# Patient Record
Sex: FEMALE | Race: WHITE | Employment: OTHER | URBAN - METROPOLITAN AREA
[De-identification: names, ages, dates, MRNs, and addresses within clinical notes are randomized per-mention and may not be internally consistent; named-entity substitution may affect disease eponyms.]

---

## 2017-06-23 PROBLEM — K57.32 DIVERTICULITIS LARGE INTESTINE W/O PERFORATION OR ABSCESS W/O BLEEDING: Status: ACTIVE | Noted: 2017-06-23

## 2020-10-20 ENCOUNTER — TELEPHONE (OUTPATIENT)
Dept: INTERNAL MEDICINE CLINIC | Age: 70
End: 2020-10-20

## 2020-10-20 NOTE — TELEPHONE ENCOUNTER
Patient states she was in the process of setting up as a new Patient with Dr. Carlos Ordoñez but got cut off before she could get scheduled. She is asking that Kaur Nicole call her back at [phone] to schedule.

## 2020-11-17 ENCOUNTER — OFFICE VISIT (OUTPATIENT)
Dept: INTERNAL MEDICINE CLINIC | Age: 70
End: 2020-11-17
Payer: MEDICARE

## 2020-11-17 VITALS
BODY MASS INDEX: 24.73 KG/M2 | DIASTOLIC BLOOD PRESSURE: 66 MMHG | SYSTOLIC BLOOD PRESSURE: 120 MMHG | RESPIRATION RATE: 12 BRPM | WEIGHT: 167 LBS | HEIGHT: 69 IN | TEMPERATURE: 96.8 F | HEART RATE: 88 BPM

## 2020-11-17 PROCEDURE — 1123F ACP DISCUSS/DSCN MKR DOCD: CPT | Performed by: INTERNAL MEDICINE

## 2020-11-17 PROCEDURE — G8427 DOCREV CUR MEDS BY ELIG CLIN: HCPCS | Performed by: INTERNAL MEDICINE

## 2020-11-17 PROCEDURE — G8420 CALC BMI NORM PARAMETERS: HCPCS | Performed by: INTERNAL MEDICINE

## 2020-11-17 PROCEDURE — G8400 PT W/DXA NO RESULTS DOC: HCPCS | Performed by: INTERNAL MEDICINE

## 2020-11-17 PROCEDURE — 4040F PNEUMOC VAC/ADMIN/RCVD: CPT | Performed by: INTERNAL MEDICINE

## 2020-11-17 PROCEDURE — 4004F PT TOBACCO SCREEN RCVD TLK: CPT | Performed by: INTERNAL MEDICINE

## 2020-11-17 PROCEDURE — G8484 FLU IMMUNIZE NO ADMIN: HCPCS | Performed by: INTERNAL MEDICINE

## 2020-11-17 PROCEDURE — 3017F COLORECTAL CA SCREEN DOC REV: CPT | Performed by: INTERNAL MEDICINE

## 2020-11-17 PROCEDURE — 99204 OFFICE O/P NEW MOD 45 MIN: CPT | Performed by: INTERNAL MEDICINE

## 2020-11-17 PROCEDURE — 1090F PRES/ABSN URINE INCON ASSESS: CPT | Performed by: INTERNAL MEDICINE

## 2020-11-17 RX ORDER — ASPIRIN 81 MG/1
81 TABLET ORAL DAILY
COMMUNITY

## 2020-11-17 ASSESSMENT — PATIENT HEALTH QUESTIONNAIRE - PHQ9
2. FEELING DOWN, DEPRESSED OR HOPELESS: 0
1. LITTLE INTEREST OR PLEASURE IN DOING THINGS: 0
SUM OF ALL RESPONSES TO PHQ QUESTIONS 1-9: 0
SUM OF ALL RESPONSES TO PHQ QUESTIONS 1-9: 0
SUM OF ALL RESPONSES TO PHQ9 QUESTIONS 1 & 2: 0
SUM OF ALL RESPONSES TO PHQ QUESTIONS 1-9: 0

## 2020-11-17 NOTE — PATIENT INSTRUCTIONS
To do list:    #1 please schedule your mammogram.  Order provided by Dr. Tomy Hankins    #2 please schedule your bone density (DEXA). Order provided by Dr. Tomy Hankins    #3 please obtain your lab test as soon as possible. LAB address----> [address]    #4  Provide doses of your vitamin D, vitamin C, vitamin D    #5 please schedule with our psychologist, Dr. Lorie Amos    Patient Education        Adjustment Disorder: Care Instructions  Your Care Instructions     Adjustment disorder means that you have emotional or behavioral problems because of stress. But your response to the stress is far more severe than a normal response. It is severe enough to affect your work or social life and may cause depression and physical pains and problems. Events that may cause this response can include a divorce, money problems, or starting school or a new job. It might be anything that causes some stress. This disorder is most often a short-term problem. It happens within 3 months of the stressful event or change. If the response lasts longer than 6 months after the event ends, you may have a more serious disorder. Follow-up care is a key part of your treatment and safety. Be sure to make and go to all appointments, and call your doctor if you are having problems. It's also a good idea to know your test results and keep a list of the medicines you take. How can you care for yourself at home? · Go to all counseling sessions. Do not skip any because you are feeling better. · If your doctor prescribed medicines, take them exactly as prescribed. Call your doctor if you think you are having a problem with your medicine. You will get more details on the specific medicines your doctor prescribes. · Discuss the causes of your stress with a good friend or family member. Or you can join a support group for people with similar problems. Talking to others sometimes relieves stress.   · Get at least 30 minutes of exercise on most days of the week. Walking is a good choice. You also may want to do other activities, such as running, swimming, cycling, or playing tennis or team sports. Relaxation techniques  Do relaxation exercises 10 to 20 minutes a day. You can play soothing, relaxing music while you do them, if you wish. · Tell others in your house that you are going to do your relaxation exercises. Ask them not to disturb you. · Find a comfortable, quiet place. · Lie down on your back, or sit with your back straight. · Focus on your breathing. Make it slow and steady. · Breathe in through your nose. Breathe out through either your nose or mouth. · Breathe deeply, filling up the area between your navel and your rib cage. Breathe so that your belly goes up and down. · Do not hold your breath. · Breathe like this for 5 to 10 minutes. Notice the feeling of calmness throughout your whole body. As you continue to breathe slowly and deeply, relax by doing these next steps for another 5 to 10 minutes:  · Tighten and relax each muscle group in your body. Start at your toes, and work your way up to your head. · Imagine your muscle groups relaxing and getting heavy. · Empty your mind of all thoughts. · Let yourself relax more and more deeply. · Be aware of the state of calmness that surrounds you. · When your relaxation time is over, you can bring yourself back to alertness by moving your fingers and toes. Then move your hands and feet. And then move your entire body. Sometimes people fall asleep during relaxation. But they most often wake up soon. · Always give yourself time to return to full alertness before you drive a car. Wait to do anything that might cause an accident if you are not fully alert. Never play a relaxation tape while you drive a car. When should you call for help? Call 911 anytime you think you may need emergency care. For example, call if:    · You feel you cannot stop from hurting yourself or someone else.  Keep the numbers for these national suicide hotlines: 1-564-095-TALK (7-178.608.5769) and 8-793-DOGGANI (0-104.136.5917). If you or someone you know talks about suicide or feeling hopeless, get help right away. Watch closely for changes in your health, and be sure to contact your doctor if:    · You have new anxiety, or your anxiety gets worse.     · You have been feeling sad, depressed, or hopeless or have lost interest in things that you usually enjoy.     · You do not get better as expected. Where can you learn more? Go to https://Acton PharmaceuticalspemyDrugCostseweb.Metranome. org and sign in to your [employer] account. Enter 0688 [id number] in the L[employer] L[employer]o box to learn more about \"Adjustment Disorder: Care Instructions. \"     If you do not have an account, please click on the \"Sign Up Now\" link. Current as of: December 16, 2019               Content Version: 12.6  © 7640-6052 TeleDNA. Care instructions adapted under license by [facility] ([facility]). If you have questions about a medical condition or this instruction, always ask your healthcare professional. [place] [address] any warranty or liability for your use of this information. Patient Education        Hyperthyroidism: Care Instructions  Your Care Instructions  Hyperthyroidism occurs when the thyroid gland makes too much thyroid hormone. This speeds up your metabolism--how your body uses energy. This condition can cause you to be very active, lose weight, and have sleep problems, eye problems, and a fast heart rate. It can also cause a goiter. A goiter is an enlarged thyroid gland that you can see at the front of the neck. Hyperthyroidism is often caused by Graves' disease. In Graves' disease, the body's defense (immune) system attacks the thyroid gland. Your doctor may prescribe a beta-blocker medicine to slow your pulse and calm you down. But this is not a treatment for hyperthyroidism. It is given for your fast heart rate.  Your doctor may also give you antithyroid medicine. This medicine keeps excess thyroid hormone in check. In some cases, doctors recommend radioactive iodine or surgery to remove the thyroid. After either of these treatments, you may need to take medicine to replace thyroid hormone for the rest of your life. Follow-up care is a key part of your treatment and safety. Be sure to make and go to all appointments, and call your doctor if you are having problems. It's also a good idea to know your test results and keep a list of the medicines you take. How can you care for yourself at home? · Take your medicines exactly as prescribed. You need to take the thyroid medicine at the same time each day. Call your doctor if you think you are having a problem with your medicine. · Graves' disease can make your eyes sore. Use artificial tears, eye drops, and sunglasses to protect your eyes from dryness, wind, and sun. Raise your head with pillows at night to prevent your eyes from swelling. In some cases, taping your eyelids shut at night will keep your eyes from being dry in the morning. · Make sure you get enough calcium. Foods that are rich in calcium include milk, yogurt, cheese, and dark green vegetables. · If you need to gain weight, ask your doctor about special diets. · Do not eat kelp. Marie Bello is high in iodine, which can make hyperthyroidism worse. Marie Mar is commonly used in [employer] and other [place] foods. You can use iodized salt and eat bread and seafood. Try to eat a balanced diet. · Do not use caffeine and other stimulants. These can make symptoms worse, such as a fast heartbeat, nervousness, and problems focusing. · Do not smoke. Smoking can make your condition worse and may lead to more serious eye problems. If you need help quitting, talk to your doctor about stop-smoking programs and medicines. These can increase your chances of quitting for good. · Lower your stress.  Learn to use biofeedback, guided imagery, meditation, or other methods to relax. · Use creams or ointments for irritated skin. Ask your doctor which type to use. · Tell all your doctors about your condition. They need to know because some medicines contain iodine. When should you call for help? Call your doctor now or seek immediate medical care if:    · You have symptoms of a sudden, very high thyroid level (thyroid storm). These include:  ? Being nauseated, vomiting, and having diarrhea. ? Sweating a lot. ? Feeling extremely restless and confused. ? Having a high fever. ? Having a fast heartbeat.     · You have sudden vision changes or eye pain.     · You have a fever or severe sore throat and are taking antithyroid medicines, such as PTU or methimazole. Watch closely for changes in your health, and be sure to contact your doctor if:    · You have a sore throat or have problems swallowing.     · You have swollen, itchy, or red eyes or your other eye symptoms get worse, or you have new vision problems.     · You have signs of a low thyroid level (hypothyroidism). You may feel very tired, confused, or weak. Where can you learn more? Go to https://Pureflection Day Spa & Hair Studiopepiceweb.Boost My Ads. org and sign in to your uTrack TV account. Enter U333 in the HotLink box to learn more about \"Hyperthyroidism: Care Instructions. \"     If you do not have an account, please click on the \"Sign Up Now\" link. Current as of: March 31, 2020               Content Version: 12.6  © 1743-0402 Votizen. Care instructions adapted under license by [facility] [employer] [facility] ([facility]). If you have questions about a medical condition or this instruction, always ask your healthcare professional. [place] [address] any warranty or liability for your use of this information. Patient Education        Hypothyroidism: Care Instructions  Your Care Instructions     When you have hypothyroidism, your body doesn't make enough thyroid hormone.  This hormone helps your body use energy. If your thyroid level is low, you may feel tired, be constipated, have an increase in your blood pressure, or have dry skin or memory problems. You may also get cold easily, even when it is warm. Women with low thyroid levels may have heavy menstrual periods. A blood test to find your thyroid-stimulating hormone (TSH) level is used to check for hypothyroidism. A high TSH level may mean that you have it. The treatment for hypothyroidism is thyroid hormone pills. You should start to feel better in 1 to 2 weeks. Most people need treatment for the rest of their lives. You will need regular visits with your doctor to make sure you are doing well and that you have the right dose of medicine. Follow-up care is a key part of your treatment and safety. Be sure to make and go to all appointments, and call your doctor if you are having problems. It's also a good idea to know your test results and keep a list of the medicines you take. How can you care for yourself at home? · Take your thyroid hormone medicine exactly as prescribed. Call your doctor if you think you are having a problem with your medicine. Most people do not have side effects if they take the right amount of medicine regularly. ? Take the medicine 30 minutes before breakfast, and do not take it with calcium, vitamins, or iron. ? Do not take extra doses of your thyroid medicine. It will not help you get better any faster, and it may cause side effects. ? If you forget to take a dose, do NOT take a double dose of medicine. Take your usual dose the next day. · Tell your doctor about all prescription, herbal, or over-the-counter products you take. · Take care of yourself. Eat a healthy diet, get enough sleep, and get regular exercise. When should you call for help? Call 911 anytime you think you may need emergency care.  For example, call if:    · You passed out (lost consciousness).     · You have severe trouble breathing.     · You have a very slow heartbeat (less than 60 beats a minute).     · You have a low body temperature (95°F or below). Call your doctor now or seek immediate medical care if:    · You feel tired, sluggish, or weak.     · You have trouble remembering things or concentrating.     · You do not begin to feel better 2 weeks after starting your medicine. Watch closely for changes in your health, and be sure to contact your doctor if you have any problems. Where can you learn more? Go to https://ADP.AccuDraft. org and sign in to your Fashion.me account. Enter H760 in the [employer] box to learn more about \"Hypothyroidism: Care Instructions. \"     If you do not have an account, please click on the \"Sign Up Now\" link. Current as of: March 31, 2020               Content Version: 12.6  © 3126-4181 [employer]ops[employer]. Care instructions adapted under license by [facility] ([facility]). If you have questions about a medical condition or this instruction, always ask your healthcare professional. [address] any warranty or liability for your use of this information.

## 2020-11-17 NOTE — PROGRESS NOTES
of vision, double vision, tearing, itching, eye pain. EARS:  Neg Hearing loss, tinnitus, vertigo, discharge or otalgia. NOSE:  Neg  Rhinorrhea, sneezing, itching, allergy, epistaxis, or snoring  MOUTH/THROAT:  Neg Bleeding gums, hoarseness, sore throat, dysphagia, throat infections, or dentures  RESPIRATORY:  Neg SOB ,wheeze, cough, sputum, hemoptysis. No report of + TB test.    CARDIOVASCULAR:  Neg Chest pain, palpitations, heart murmur, dyspnea on exertion, orthopnea, paroxysmal nocturnal dyspnea or edema of extremities, or claudication  GASTROINTESTINAL:  Neg   Nausea, vomiting, or diarrhea, constipation hematemesis, heart burn, dysphagia,change in bowel movements or stool caliber, hematochezia, melena, abdominal pain, or food intolerance. Colonoscopy: Yes, 2016. GENITOURINARY:  Neg  Urinary frequency, hesitancy, urgency, polyuria, dysuria, hematuria, nocturia, incontinence, change in stream, genital pain or swelling, kidney stones, STD's. PAP/MUSA: Yes  HEMATOLOGIC/LYMPHATIC:  Neg  Anemia, bleeding dyscrasias, easy bruising, blood clots (DVT/PE), transfusions, or enlarged lymph nodes  MUSCULOSKELETAL:  Neg  New myalgias, bone pain, joint pain, joint swelling, low back pain, neck pain, radicular pain, or fractures. NEUROLOGICAL:  Neg  Loss of Consciousness, memeory loss or forgetfulness, confusion, difficulty concentrating, seizures, insomina, aphasia or dysarthria unilateral weakness or paresthesias, ataxia, headaches, syncope, tremor, or H/O head trauma. PSYCHIATRIC:  Neg  Depression, anxiety, personality changes, nervousness, drug or alcohol use/abuse, H/O psych counseling: No, Psychotropics: No  SKIN :  Neg  Rash, nail changes, sun burns, tattoos, change in moles, or skin color changes  ENDOCRINE:  Neg  Polydipsia,polyuria,abnormal weight changes,heat /cold intolerance, Hair changes. No H/O Diabetes. + Thyroid disease.          Physical Exam    /66   Pulse 88   Temp 96.8 °F (36 °C)   Resp 12 hyperlipidemia    - Lipid Panel; Future    4. Adjustment disorder with depressed mood  Recommend counseling with our psychologist  Literature provided  May need further evaluation with neuropsychological evaluation to assess mood and cognitive function    5. Need for hepatitis C screening test    - Hepatitis C Antibody; Future     6. Memory problems  Consider full neuropsychological testing to evaluate mood and cognitive function. Records reviewed from [place] and [address] [place] [address].

## 2020-11-20 ENCOUNTER — TELEPHONE (OUTPATIENT)
Dept: INTERNAL MEDICINE CLINIC | Age: 70
End: 2020-11-20

## 2020-11-20 NOTE — TELEPHONE ENCOUNTER
Marlo Brizuela with [facility] is calling to request an order for a DEXA Scan for the patient. Patient states Dr. Anika Brennan is wanting her to have one done. Please contact Marlo Brizuela to advise.

## 2020-12-27 ENCOUNTER — APPOINTMENT (OUTPATIENT)
Dept: GENERAL RADIOLOGY | Age: 70
End: 2020-12-27
Payer: MEDICARE

## 2020-12-27 ENCOUNTER — APPOINTMENT (OUTPATIENT)
Dept: CT IMAGING | Age: 70
End: 2020-12-27
Payer: MEDICARE

## 2020-12-27 ENCOUNTER — TELEPHONE (OUTPATIENT)
Dept: INTERNAL MEDICINE CLINIC | Age: 70
End: 2020-12-27

## 2020-12-27 ENCOUNTER — HOSPITAL ENCOUNTER (EMERGENCY)
Age: 70
Discharge: HOME | End: 2020-12-27
Attending: STUDENT IN AN ORGANIZED HEALTH CARE EDUCATION/TRAINING PROGRAM
Payer: MEDICARE

## 2020-12-27 VITALS
TEMPERATURE: 100.5 F | SYSTOLIC BLOOD PRESSURE: 122 MMHG | OXYGEN SATURATION: 99 % | HEART RATE: 95 BPM | DIASTOLIC BLOOD PRESSURE: 51 MMHG | RESPIRATION RATE: 18 BRPM

## 2020-12-27 LAB
ALBUMIN SERPL-MCNC: 4.2 G/DL (ref 3.4–5)
ALP BLD-CCNC: 115 U/L (ref 40–129)
ALT SERPL-CCNC: 8 U/L (ref 10–40)
ANION GAP SERPL CALCULATED.3IONS-SCNC: 13 MMOL/L (ref 3–16)
AST SERPL-CCNC: 15 U/L (ref 15–37)
BASOPHILS ABSOLUTE: 0.1 K/UL (ref 0–0.2)
BASOPHILS RELATIVE PERCENT: 0.9 %
BILIRUB SERPL-MCNC: 0.6 MG/DL (ref 0–1)
BILIRUBIN DIRECT: <0.2 MG/DL (ref 0–0.3)
BILIRUBIN URINE: NEGATIVE
BILIRUBIN, INDIRECT: ABNORMAL MG/DL (ref 0–1)
BLOOD, URINE: NEGATIVE
BUN BLDV-MCNC: 10 MG/DL (ref 7–20)
CALCIUM SERPL-MCNC: 9.2 MG/DL (ref 8.3–10.6)
CHLORIDE BLD-SCNC: 101 MMOL/L (ref 99–110)
CLARITY: CLEAR
CO2: 21 MMOL/L (ref 21–32)
COLOR: YELLOW
CREAT SERPL-MCNC: 0.7 MG/DL (ref 0.6–1.2)
EOSINOPHILS ABSOLUTE: 0 K/UL (ref 0–0.6)
EOSINOPHILS RELATIVE PERCENT: 0.2 %
GFR AFRICAN AMERICAN: >60
GFR NON-AFRICAN AMERICAN: >60
GLUCOSE BLD-MCNC: 126 MG/DL (ref 70–99)
GLUCOSE URINE: NEGATIVE MG/DL
HCT VFR BLD CALC: 44 % (ref 36–48)
HEMOGLOBIN: 14.6 G/DL (ref 12–16)
KETONES, URINE: 15 MG/DL
LACTIC ACID: 1.3 MMOL/L (ref 0.4–2)
LEUKOCYTE ESTERASE, URINE: NEGATIVE
LIPASE: 16 U/L (ref 13–60)
LYMPHOCYTES ABSOLUTE: 0.9 K/UL (ref 1–5.1)
LYMPHOCYTES RELATIVE PERCENT: 7.9 %
MCH RBC QN AUTO: 31.6 PG (ref 26–34)
MCHC RBC AUTO-ENTMCNC: 33.1 G/DL (ref 31–36)
MCV RBC AUTO: 95.6 FL (ref 80–100)
MICROSCOPIC EXAMINATION: ABNORMAL
MONOCYTES ABSOLUTE: 0.5 K/UL (ref 0–1.3)
MONOCYTES RELATIVE PERCENT: 4 %
NEUTROPHILS ABSOLUTE: 10.1 K/UL (ref 1.7–7.7)
NEUTROPHILS RELATIVE PERCENT: 87 %
NITRITE, URINE: NEGATIVE
PDW BLD-RTO: 13.5 % (ref 12.4–15.4)
PH UA: 6.5 (ref 5–8)
PLATELET # BLD: 303 K/UL (ref 135–450)
PMV BLD AUTO: 8.1 FL (ref 5–10.5)
POTASSIUM REFLEX MAGNESIUM: 3.9 MMOL/L (ref 3.5–5.1)
PROTEIN UA: NEGATIVE MG/DL
RBC # BLD: 4.61 M/UL (ref 4–5.2)
SODIUM BLD-SCNC: 135 MMOL/L (ref 136–145)
SPECIFIC GRAVITY UA: 1.01 (ref 1–1.03)
TOTAL PROTEIN: 7.4 G/DL (ref 6.4–8.2)
URINE TYPE: ABNORMAL
UROBILINOGEN, URINE: 0.2 E.U./DL
WBC # BLD: 11.6 K/UL (ref 4–11)

## 2020-12-27 PROCEDURE — 2580000003 HC RX 258: Performed by: STUDENT IN AN ORGANIZED HEALTH CARE EDUCATION/TRAINING PROGRAM

## 2020-12-27 PROCEDURE — 99283 EMERGENCY DEPT VISIT LOW MDM: CPT

## 2020-12-27 PROCEDURE — 96374 THER/PROPH/DIAG INJ IV PUSH: CPT

## 2020-12-27 PROCEDURE — 80048 BASIC METABOLIC PNL TOTAL CA: CPT

## 2020-12-27 PROCEDURE — 93005 ELECTROCARDIOGRAM TRACING: CPT | Performed by: STUDENT IN AN ORGANIZED HEALTH CARE EDUCATION/TRAINING PROGRAM

## 2020-12-27 PROCEDURE — 80076 HEPATIC FUNCTION PANEL: CPT

## 2020-12-27 PROCEDURE — 74022 RADEX COMPL AQT ABD SERIES: CPT

## 2020-12-27 PROCEDURE — 85025 COMPLETE CBC W/AUTO DIFF WBC: CPT

## 2020-12-27 PROCEDURE — 6360000004 HC RX CONTRAST MEDICATION: Performed by: STUDENT IN AN ORGANIZED HEALTH CARE EDUCATION/TRAINING PROGRAM

## 2020-12-27 PROCEDURE — 83605 ASSAY OF LACTIC ACID: CPT

## 2020-12-27 PROCEDURE — 6360000002 HC RX W HCPCS: Performed by: STUDENT IN AN ORGANIZED HEALTH CARE EDUCATION/TRAINING PROGRAM

## 2020-12-27 PROCEDURE — 96375 TX/PRO/DX INJ NEW DRUG ADDON: CPT

## 2020-12-27 PROCEDURE — 6370000000 HC RX 637 (ALT 250 FOR IP): Performed by: STUDENT IN AN ORGANIZED HEALTH CARE EDUCATION/TRAINING PROGRAM

## 2020-12-27 PROCEDURE — 74177 CT ABD & PELVIS W/CONTRAST: CPT

## 2020-12-27 PROCEDURE — 83690 ASSAY OF LIPASE: CPT

## 2020-12-27 PROCEDURE — 81003 URINALYSIS AUTO W/O SCOPE: CPT

## 2020-12-27 RX ORDER — OXYCODONE HYDROCHLORIDE 5 MG/1
5 TABLET ORAL ONCE
Status: COMPLETED | OUTPATIENT
Start: 2020-12-27 | End: 2020-12-27

## 2020-12-27 RX ORDER — SODIUM CHLORIDE, SODIUM LACTATE, POTASSIUM CHLORIDE, CALCIUM CHLORIDE 600; 310; 30; 20 MG/100ML; MG/100ML; MG/100ML; MG/100ML
1000 INJECTION, SOLUTION INTRAVENOUS ONCE
Status: COMPLETED | OUTPATIENT
Start: 2020-12-27 | End: 2020-12-27

## 2020-12-27 RX ORDER — AMOXICILLIN AND CLAVULANATE POTASSIUM 875; 125 MG/1; MG/1
1 TABLET, FILM COATED ORAL ONCE
Status: COMPLETED | OUTPATIENT
Start: 2020-12-27 | End: 2020-12-27

## 2020-12-27 RX ORDER — AMOXICILLIN AND CLAVULANATE POTASSIUM 875; 125 MG/1; MG/1
1 TABLET, FILM COATED ORAL 2 TIMES DAILY
Qty: 20 TABLET | Refills: 0 | Status: SHIPPED | OUTPATIENT
Start: 2020-12-27 | End: 2021-01-06

## 2020-12-27 RX ORDER — OXYCODONE HYDROCHLORIDE 5 MG/1
5 TABLET ORAL EVERY 6 HOURS PRN
Qty: 10 TABLET | Refills: 0 | Status: SHIPPED | OUTPATIENT
Start: 2020-12-27 | End: 2020-12-30

## 2020-12-27 RX ORDER — MORPHINE SULFATE 4 MG/ML
4 INJECTION, SOLUTION INTRAMUSCULAR; INTRAVENOUS ONCE
Status: COMPLETED | OUTPATIENT
Start: 2020-12-27 | End: 2020-12-27

## 2020-12-27 RX ORDER — POLYETHYLENE GLYCOL 3350 17 G/17G
17 POWDER, FOR SOLUTION ORAL 2 TIMES DAILY
Qty: 1020 G | Refills: 0 | Status: SHIPPED | OUTPATIENT
Start: 2020-12-27 | End: 2021-01-26

## 2020-12-27 RX ORDER — ONDANSETRON 2 MG/ML
4 INJECTION INTRAMUSCULAR; INTRAVENOUS ONCE
Status: COMPLETED | OUTPATIENT
Start: 2020-12-27 | End: 2020-12-27

## 2020-12-27 RX ORDER — ACETAMINOPHEN 500 MG
1000 TABLET ORAL ONCE
Status: COMPLETED | OUTPATIENT
Start: 2020-12-27 | End: 2020-12-27

## 2020-12-27 RX ORDER — ACETAMINOPHEN 325 MG/1
650 TABLET ORAL EVERY 6 HOURS PRN
Qty: 60 TABLET | Refills: 0 | Status: SHIPPED | OUTPATIENT
Start: 2020-12-27

## 2020-12-27 RX ORDER — ONDANSETRON 4 MG/1
4 TABLET, ORALLY DISINTEGRATING ORAL EVERY 8 HOURS PRN
Qty: 20 TABLET | Refills: 0 | Status: SHIPPED | OUTPATIENT
Start: 2020-12-27 | End: 2021-07-13

## 2020-12-27 RX ORDER — DOCUSATE SODIUM 100 MG/1
100 CAPSULE, LIQUID FILLED ORAL 2 TIMES DAILY
Qty: 30 CAPSULE | Refills: 0 | Status: SHIPPED | OUTPATIENT
Start: 2020-12-27 | End: 2022-01-13

## 2020-12-27 RX ADMIN — ACETAMINOPHEN 1000 MG: 500 TABLET, COATED ORAL at 19:06

## 2020-12-27 RX ADMIN — MORPHINE SULFATE 4 MG: 4 INJECTION, SOLUTION INTRAMUSCULAR; INTRAVENOUS at 16:29

## 2020-12-27 RX ADMIN — ONDANSETRON 4 MG: 2 INJECTION INTRAMUSCULAR; INTRAVENOUS at 16:29

## 2020-12-27 RX ADMIN — IOPAMIDOL 80 ML: 755 INJECTION, SOLUTION INTRAVENOUS at 16:54

## 2020-12-27 RX ADMIN — SODIUM CHLORIDE, SODIUM LACTATE, POTASSIUM CHLORIDE, AND CALCIUM CHLORIDE 1000 ML: .6; .31; .03; .02 INJECTION, SOLUTION INTRAVENOUS at 16:28

## 2020-12-27 RX ADMIN — AMOXICILLIN AND CLAVULANATE POTASSIUM 1 TABLET: 875; 125 TABLET, FILM COATED ORAL at 18:27

## 2020-12-27 RX ADMIN — OXYCODONE 5 MG: 5 TABLET ORAL at 18:27

## 2020-12-27 ASSESSMENT — PAIN DESCRIPTION - PAIN TYPE: TYPE: ACUTE PAIN

## 2020-12-27 ASSESSMENT — PAIN SCALES - GENERAL
PAINLEVEL_OUTOF10: 2
PAINLEVEL_OUTOF10: 8
PAINLEVEL_OUTOF10: 8
PAINLEVEL_OUTOF10: 2

## 2020-12-27 ASSESSMENT — PAIN DESCRIPTION - DESCRIPTORS: DESCRIPTORS: SHOOTING

## 2020-12-27 ASSESSMENT — PAIN DESCRIPTION - LOCATION: LOCATION: ABDOMEN

## 2020-12-27 ASSESSMENT — PAIN DESCRIPTION - FREQUENCY: FREQUENCY: CONTINUOUS

## 2020-12-27 ASSESSMENT — PAIN DESCRIPTION - ORIENTATION: ORIENTATION: RIGHT;LEFT;LOWER

## 2020-12-27 NOTE — TELEPHONE ENCOUNTER
Pt called with C/O LLQ pain radiating to the back. She thinks this is like her previous bouts of diverticulitis. + Chills. She has had multiple bouts over the last 15 years. She did see a surgeon back in 2019. No surgery was performed. Rec ER, CT, LAB. Pt was reluctant to go, but strongly advised. CT needed to R/U perforation. She agreed.

## 2020-12-27 NOTE — ED PROVIDER NOTES
ED Attending Attestation Note     Date of evaluation: 12/27/2020    This patient was seen by the resident. I have seen and examined the patient, agree with the workup, evaluation, management and diagnosis. The care plan has been discussed. My assessment reveals 70 y/o F with hx of diverticulitis who presents to the ED for 1 day of lower abdominal pain and fever similar to previous episodes. Patient tolerating PO intake. Febrile on arrival and tachycardic. Lower abdominal tenderness but no rigidity or guarding. Labs with leukocytosis 11.6. CT abd pelvis shows acute sigmoid diverticulitis with no evidence of abscess or perforation. HR improved with pain control and fluids. Will attempt outpatient course of abx and have her follow up with PCP. Counseled on possibility of worsening condition and gave strict return precautions.      Lindsey Perkins MD  12/27/20 1857

## 2020-12-27 NOTE — ED PROVIDER NOTES
[address] [facility] RESIDENT NOTE       Date of evaluation: 12/27/2020    Chief Complaint     Abdominal Pain      History of Present Illness     Keyla Hannon is a 69 y.o. female  with PMHx recurrent diverticulitis who presents with 3 days of lower abdominal pain is currently 8/10, constant, dull, nonradiating. States this feels like prior episodes of diverticulitis. She has had some nausea but no vomiting with this. Bowel movements have been normal and nonbloody. Denies any dysuria or hematuria. Denies any vaginal bleeding or discharge. She has had fever at home. No cough, SOB, chest pain. Tolerating PO at home. Patient has not yet tried any other treatment for their symptoms and nothing else seems to make them better or worse. Aside from the above, patient denies any aggravating or alleviating factors or associated symptoms. Review of Systems     Positive for: Abdominal pain, fevers  Negative for: Chest pain, shortness of breath, headaches, weakness, numbness, bowel changes, bladder changes, vaginal discharge or bleeding. All other systems reviewed and negative, except as stated in HPI. Past Medical, Surgical, Family, and Social History     She has a past medical history of Diverticula of colon, Diverticulitis, Graves' disease, Hyperthyroidism, and Thyroid disease. She has a past surgical history that includes Hysterectomy (2009); Appendectomy; Rotator cuff repair (Right); Cataract removal with implant (Bilateral); and Foot surgery (Bilateral). Her family history includes Arthritis in her sister; Heart Attack in her brother; Heart Attack (age of onset: 80) in her sister; Willim [place] in her brother; Mult Sclerosis (age of onset: 22) in her son. She reports that she has never smoked. She has never used smokeless tobacco. She reports that she does not drink alcohol or use drugs.     Medications     Previous Medications Platelets 406 256 - 582 K/uL    MPV 8.1 5.0 - 10.5 fL    Neutrophils % 87.0 %    Lymphocytes % 7.9 %    Monocytes % 4.0 %    Eosinophils % 0.2 %    Basophils % 0.9 %    Neutrophils Absolute 10.1 (H) 1.7 - 7.7 K/uL    Lymphocytes Absolute 0.9 (L) 1.0 - 5.1 K/uL    Monocytes Absolute 0.5 0.0 - 1.3 K/uL    Eosinophils Absolute 0.0 0.0 - 0.6 K/uL    Basophils Absolute 0.1 0.0 - 0.2 K/uL   Basic Metabolic Panel w/ Reflex to MG   Result Value Ref Range    Sodium 135 (L) 136 - 145 mmol/L    Potassium reflex Magnesium 3.9 3.5 - 5.1 mmol/L    Chloride 101 99 - 110 mmol/L    CO2 21 21 - 32 mmol/L    Anion Gap 13 3 - 16    Glucose 126 (H) 70 - 99 mg/dL    BUN 10 7 - 20 mg/dL    CREATININE 0.7 0.6 - 1.2 mg/dL    GFR Non-African American >60 >60    GFR African American >60 >60    Calcium 9.2 8.3 - 10.6 mg/dL   Lactate, plasma   Result Value Ref Range    Lactic Acid 1.3 0.4 - 2.0 mmol/L   Hepatic function panel (LFTs)   Result Value Ref Range    Total Protein 7.4 6.4 - 8.2 g/dL    Alb 4.2 3.4 - 5.0 g/dL    Alkaline Phosphatase 115 40 - 129 U/L    ALT 8 (L) 10 - 40 U/L    AST 15 15 - 37 U/L    Total Bilirubin 0.6 0.0 - 1.0 mg/dL    Bilirubin, Direct <0.2 0.0 - 0.3 mg/dL    Bilirubin, Indirect see below 0.0 - 1.0 mg/dL   Lipase   Result Value Ref Range    Lipase 16.0 13.0 - 60.0 U/L   Urinalysis, reflex to microscopic (Lab)   Result Value Ref Range    Color, UA Yellow Straw/Yellow    Clarity, UA Clear Clear    Glucose, Ur Negative Negative mg/dL    Bilirubin Urine Negative Negative    Ketones, Urine 15 (A) Negative mg/dL    Specific Gravity, UA 1.010 1.005 - 1.030    Blood, Urine Negative Negative    pH, UA 6.5 5.0 - 8.0    Protein, UA Negative Negative mg/dL    Urobilinogen, Urine 0.2 <2.0 E.U./dL    Nitrite, Urine Negative Negative    Leukocyte Esterase, Urine Negative Negative    Microscopic Examination Not Indicated     Urine Type Voided RECENT VITALS:  BP: (!) 122/51, Temp: 102.5 °F (39.2 °C), Pulse: 95,Resp: 18, SpO2: 99 %     ED Course     Nursing Notes, Past Medical Hx, Past Surgical Hx, Social Hx, Allergies, and Family Hx were reviewed. The patient was given the followingmedications:  Orders Placed This Encounter   Medications    morphine injection 4 mg    ondansetron (ZOFRAN) injection 4 mg    lactated ringers infusion 1,000 mL    iopamidol (ISOVUE-370) 76 % injection 80 mL    amoxicillin-clavulanate (AUGMENTIN) 875-125 MG per tablet 1 tablet    oxyCODONE (ROXICODONE) immediate release tablet 5 mg    acetaminophen (TYLENOL) tablet 1,000 mg    amoxicillin-clavulanate (AUGMENTIN) 875-125 MG per tablet     Sig: Take 1 tablet by mouth 2 times daily for 10 days     Dispense:  20 tablet     Refill:  0    acetaminophen (TYLENOL) 325 MG tablet     Sig: Take 2 tablets by mouth every 6 hours as needed for Pain     Dispense:  60 tablet     Refill:  0    ondansetron (ZOFRAN ODT) 4 MG disintegrating tablet     Sig: Take 1 tablet by mouth every 8 hours as needed for Nausea     Dispense:  20 tablet     Refill:  0    oxyCODONE (ROXICODONE) 5 MG immediate release tablet     Sig: Take 1 tablet by mouth every 6 hours as needed for Pain for up to 3 days. WARNING:  May cause drowsiness. May impair ability to operate vehicles or machinery. Do not use in combination with alcohol.      Dispense:  10 tablet     Refill:  0    polyethylene glycol (GLYCOLAX) 17 GM/SCOOP powder     Sig: Take 17 g by mouth 2 times daily     Dispense:  1020 g     Refill:  0    docusate sodium (COLACE) 100 MG capsule     Sig: Take 1 capsule by mouth 2 times daily     Dispense:  30 capsule     Refill:  0       CONSULTS:  None    MEDICAL DECISION Harvinder Byers / Reshma Michelle / Dianne Form Emperatriz Cm is a 69 y.o. female with a history and presentation as described above in HPI. The patient was evaluated by myself and the ED Attending Physician, Dr. Bam Alberts. All management and disposition plans were discussed and agreed upon. Appropriate labs and diagnostic studies were reviewed as they were made available. Pertinent laboratory studies in medical decision making are listed below. Appropriate labs and diagnostic studies were reviewed as they were made available. Pertinent laboratory studies in medical decision making are listed below. Upon presentation, the patient was evaluated by me. Patient presents with few days of lower abdominal pain consistent with prior episodes of diverticulitis. Here she is febrile but otherwise hemodynamically stable and saturating well. Exam is notable for lower abdominal tenderness that is worse on the right without rebound or guarding. Renal panel without acute kidney injury or electrolyte abnormality. Hepatic panel is benign lipase is not elevated. CBC with mild leukocytosis to 11.6 but no anemia. CT scan of the abdomen pelvis was obtained that demonstrated uncomplicated sigmoid diverticulitis. Patient was given 1 L of fluids here Tylenol. She was also given morphine for pain with initial relief but pain returned slightly later and was given oxycodone as a trial to prove that she would be able to tolerate her pain at home. Given first dose of Augmentin here. Patient would like to go home and I believe she is a suitable candidate for outpatient treatment with good PCP follow-up. The patient's ultimate disposition was: discharge    Risks, benefits, and alternatives were discussed. At this time the patient has been deemed safe for discharge. The patient is well-appearing, afebrile and hemodynamically stable. My customary discharge instructions including strict return precautions for worsening or new symptoms have been communicated.

## 2020-12-28 LAB
EKG ATRIAL RATE: 120 BPM
EKG DIAGNOSIS: NORMAL
EKG P AXIS: 50 DEGREES
EKG P-R INTERVAL: 170 MS
EKG Q-T INTERVAL: 302 MS
EKG QRS DURATION: 76 MS
EKG QTC CALCULATION (BAZETT): 426 MS
EKG R AXIS: 80 DEGREES
EKG T AXIS: 39 DEGREES
EKG VENTRICULAR RATE: 120 BPM

## 2021-01-03 ENCOUNTER — TELEPHONE (OUTPATIENT)
Dept: INTERNAL MEDICINE CLINIC | Age: 71
End: 2021-01-03

## 2021-01-03 NOTE — TELEPHONE ENCOUNTER
Patient called 1/2/21 at 8:53 am- seen at [facility] ER 12/27 for diverticulitis- prescribed Augmentin, pain and nausea medications. Instructed on AVS to follow up in 3 days with PCP, which she misunderstood that she needed to be seen on Saturday. Feeling better overall, but still having some LLQ pain. She was advised to call back on Monday morning to schedule follow up appointment, but to call me back if symptoms worsen.

## 2021-01-06 ENCOUNTER — OFFICE VISIT (OUTPATIENT)
Dept: INTERNAL MEDICINE CLINIC | Age: 71
End: 2021-01-06
Payer: MEDICARE

## 2021-01-06 VITALS
WEIGHT: 161 LBS | DIASTOLIC BLOOD PRESSURE: 70 MMHG | RESPIRATION RATE: 12 BRPM | TEMPERATURE: 96.7 F | SYSTOLIC BLOOD PRESSURE: 126 MMHG | BODY MASS INDEX: 23.78 KG/M2 | HEART RATE: 78 BPM

## 2021-01-06 DIAGNOSIS — F43.21 ADJUSTMENT DISORDER WITH DEPRESSED MOOD: ICD-10-CM

## 2021-01-06 DIAGNOSIS — R41.3 MEMORY PROBLEM: ICD-10-CM

## 2021-01-06 DIAGNOSIS — K57.32 DIVERTICULITIS OF LARGE INTESTINE WITHOUT PERFORATION OR ABSCESS WITHOUT BLEEDING: Primary | ICD-10-CM

## 2021-01-06 PROCEDURE — 1123F ACP DISCUSS/DSCN MKR DOCD: CPT | Performed by: INTERNAL MEDICINE

## 2021-01-06 PROCEDURE — G8420 CALC BMI NORM PARAMETERS: HCPCS | Performed by: INTERNAL MEDICINE

## 2021-01-06 PROCEDURE — 4040F PNEUMOC VAC/ADMIN/RCVD: CPT | Performed by: INTERNAL MEDICINE

## 2021-01-06 PROCEDURE — 3017F COLORECTAL CA SCREEN DOC REV: CPT | Performed by: INTERNAL MEDICINE

## 2021-01-06 PROCEDURE — 1111F DSCHRG MED/CURRENT MED MERGE: CPT | Performed by: INTERNAL MEDICINE

## 2021-01-06 PROCEDURE — 99214 OFFICE O/P EST MOD 30 MIN: CPT | Performed by: INTERNAL MEDICINE

## 2021-01-06 PROCEDURE — G8484 FLU IMMUNIZE NO ADMIN: HCPCS | Performed by: INTERNAL MEDICINE

## 2021-01-06 PROCEDURE — 1036F TOBACCO NON-USER: CPT | Performed by: INTERNAL MEDICINE

## 2021-01-06 PROCEDURE — G8400 PT W/DXA NO RESULTS DOC: HCPCS | Performed by: INTERNAL MEDICINE

## 2021-01-06 PROCEDURE — G8427 DOCREV CUR MEDS BY ELIG CLIN: HCPCS | Performed by: INTERNAL MEDICINE

## 2021-01-06 PROCEDURE — 1090F PRES/ABSN URINE INCON ASSESS: CPT | Performed by: INTERNAL MEDICINE

## 2021-01-06 NOTE — PATIENT INSTRUCTIONS
Patient Education        Diverticulitis: Care Instructions  Overview     Diverticulitis occurs when pouches form in the wall of the colon and become inflamed or infected. It can be very painful. Doctors aren't sure what causes diverticulitis. There is no proof that foods such as nuts, seeds, or berries cause it or make it worse. A low-fiber diet may cause the colon to work harder to push stool forward. Pouches may form because of this extra work. It may be hard to think about healthy eating while you're in pain. But as you recover, you might think about how you can use healthy eating for overall better health. Healthy eating may help you avoid future attacks. Follow-up care is a key part of your treatment and safety. Be sure to make and go to all appointments, and call your doctor if you are having problems. It's also a good idea to know your test results and keep a list of the medicines you take. How can you care for yourself at home? · Drink plenty of fluids, enough so that your urine is light yellow or clear like water. If you have kidney, heart, or liver disease and have to limit fluids, talk with your doctor before you increase the amount of fluids you drink. · Stay with liquids or a bland diet (plain rice, bananas, dry toast or crackers, applesauce) until you are feeling better. Then you can return to regular foods and slowly increase the amount of fiber in your diet. · Use a heating pad set on low on your belly to relieve mild cramps and pain. · Get extra rest until you are feeling better. · Be safe with medicines. Read and follow all instructions on the label. ? If the doctor gave you a prescription medicine for pain, take it as prescribed. ? If you are not taking a prescription pain medicine, ask your doctor if you can take an over-the-counter medicine. · If your doctor prescribed antibiotics, take them as directed. Do not stop taking them just because you feel better.  You need to take the full course of antibiotics. · Do not use laxatives or enemas unless your doctor tells you to use them. When should you call for help? Call your doctor now or seek immediate medical care if:    · You have a fever.     · You are vomiting.     · You have new or worse belly pain.     · You cannot pass stools or gas. Watch closely for changes in your health, and be sure to contact your doctor if you have any problems. Where can you learn more? Go to https://[employer]. org and sign in to your [employer] account. Enter H901 in the AllFreed box to learn more about \"Diverticulitis: Care Instructions. \"     If you do not have an account, please click on the \"Sign Up Now\" link. Current as of: April 15, 2020               Content Version: 12.6  © 7946-4002 [employer]. Care instructions adapted under license by [facility] ([facility]). If you have questions about a medical condition or this instruction, always ask your healthcare professional. [place] [address] any warranty or liability for your use of this information. Patient Education        Learning About Diverticulosis and Diverticulitis  What are diverticulosis and diverticulitis? In diverticulosis and diverticulitis, pouches called diverticula form in the wall of the large intestine, or colon. · In diverticulosis, the pouches do not cause any pain or other symptoms. · In diverticulitis, the pouches get inflamed or infected and cause symptoms. Doctors aren't sure what causes these pouches in the colon. But they think that a low-fiber diet may play a role. Without fiber to add bulk to the stool, the colon has to work harder than normal to push the stool forward. The pressure from this may cause pouches to form in weak spots along the colon. Some people with diverticulosis get diverticulitis. But experts don't know why this happens. What are the symptoms?   · In diverticulosis, most people don't have symptoms. But pouches sometimes bleed. · In diverticulitis, symptoms may last from a few hours to a week or more. They include:  ? Belly pain. This is usually in the lower left side. It is sometimes worse when you move. This is the most common symptom. ? Fever and chills. ? Bloating and gas. ? Diarrhea or constipation. ? Nausea and sometimes vomiting.  ? Not feeling like eating. How can you prevent diverticulitis? You may be able to lower your chance of getting diverticulitis. You can do this by taking steps to prevent constipation. · Eat fruits, vegetables, beans, and whole grains every day. These foods are high in fiber. · Drink plenty of fluids. (Drink enough so that your urine is light yellow or clear like water.) If you have kidney, heart, or liver disease and have to limit fluids, talk with your doctor before you increase the amount of fluids you drink. · Get at least 30 minutes of exercise on most days of the week. Walking is a good choice. You also may want to do other activities, such as running, swimming, cycling, or playing tennis or team sports. · Take a fiber supplement, such as Citrucel or Metamucil, every day if needed. Read and follow all instructions on the label. · Schedule time each day for a bowel movement. Having a daily routine may help. Take your time and do not strain when having a bowel movement. Some people avoid nuts, seeds, berries, and popcorn. They believe that these foods might get trapped in the diverticula and cause pain. But there is no proof that these foods cause diverticulitis or make it worse. How are these problems treated? · The best way to treat diverticulosis is to avoid constipation. · Treatment for diverticulitis includes antibiotics. It often includes a change in your diet. You may need only liquids at first. Your doctor may suggest pain medicines for pain or belly cramps. In some cases, surgery may be needed.   Follow-up care is a key part of your treatment and safety. Be sure to make and go to all appointments, and call your doctor if you are having problems. It's also a good idea to know your test results and keep a list of the medicines you take. Where can you learn more? Go to https://dianne.[facility]. org and sign in to your Grow account. Enter X788 in the Tarena box to learn more about \"Learning About Diverticulosis and Diverticulitis. \"     If you do not have an account, please click on the \"Sign Up Now\" link. Current as of: April 15, 2020               Content Version: 12.6  © 5692-4208 [employer]. Care instructions adapted under license by [facility] ([facility]). If you have questions about a medical condition or this instruction, always ask your healthcare professional. [address] any warranty or liability for your use of this information.

## 2021-01-06 NOTE — PROGRESS NOTES
[facility]) Physicians  Internal Medicine  Patient Encounter  Toña Jackson D.O., [facility]        Chief Complaint   Patient presents with    Follow-Up from Hospital       HPI: 69 y.o. female seen today status post ER visit on 12/27/2020. Son is with her today. Patient had called the office here on 12/27/2020 with complaint of left lower quadrant abdominal pain radiating into the back. Patient has history of recurrent episodes of diverticulitis and symptoms were exactly the same. She did have associated chills. Patient reports that she has had multiple bouts of diverticulitis over the last 15 years. She is even seen a surgeon for consultation. She has been hospitalized at least 6 times. Her last episode was 2018. Since then she had been doing well. Son states her previous bouts seem to flare when stress increases. Reviewed lab and CT scan. CT scan showed acute sigmoid diverticulitis without evidence of perforation or abscess. Her white blood cell count was slightly elevated to 11,600. She states she is feeling 100% better as of today. Her bowels are doing better. Colonoscopy was done last in 2011. Son also wanted to touch on her memory issues. He and Rishi Murray are in favor of a full neuropsychological evaluation and may be interested in some counseling.       Past Medical History:   Diagnosis Date    Diverticula of colon     Diverticulitis 08/2019   [address]' disease     Hyperthyroidism     Thyroid disease          MEDICATIONS:  Medication Sig   amoxicillin-clavulanate (AUGMENTIN) 875-125 MG per tablet Take 1 tablet by mouth 2 times daily for 10 days   docusate sodium (COLACE) 100 MG capsule Take 1 capsule by mouth 2 times daily   VITAMIN E PO Take by mouth   VITAMIN D PO Take by mouth   Multiple Vitamin (MULTI-VITAMIN DAILY PO) Take by mouth   levothyroxine (SYNTHROID) 25 MCG tablet Take 25 mcg by mouth Daily   acetaminophen (TYLENOL) 325 MG tablet Take 2 tablets by mouth every 6 hours as needed for Pain   ondansetron (ZOFRAN ODT) 4 MG disintegrating tablet Take 1 tablet by mouth every 8 hours as needed for Nausea  Patient not taking: Reported on 1/6/2021   polyethylene glycol (GLYCOLAX) 17 GM/SCOOP powder Take 17 g by mouth 2 times daily  Patient not taking: Reported on 1/6/2021   aspirin 81 MG EC tablet Take 81 mg by mouth daily   Ascorbic Acid (VITAMIN C PO) Take by mouth           Review of Systems - As per HPI    OBJECTIVE:  /70   Pulse 78   Temp 96.7 °F (35.9 °C)   Resp 12   Wt 161 lb (73 kg)   BMI 23.78 kg/m²   GEN: NAD, A&O, Non-toxic  HEENT: NC/AT, NELIA, EOMI, Oral cavity Clear,  TM's NL, Nasal cavity clear. NECK: Supple. No thyromegaly. No JVD  LYMPH: No C/SC nodes. CV: S1 S2 NL, RRR. No murmurs, clicks or rubs. PULM: CTA, symmentric air exchange  EXT: No C/C/E  GI: Abdomen is soft, NT  PSYCH: Affect is a little flat. Mood slightly depressed. Psychomotor activity is depressed. She seems to process a little on the slow side but also seems to be getting overwhelmed with the information. ASSESSMENT[de-identified]  Demian Henderson was seen today for follow-up from hospital.    Diagnoses and all orders for this visit:    Diverticulitis of large intestine without perforation or abscess without bleeding  -     NM DISCHARGE MEDS RECONCILED W/ CURRENT OUTPATIENT MED LIST    Memory problem  -     Neuropsychological assessment; Future    Adjustment disorder with depressed mood  -     Neuropsychological assessment; Future        Additional Plan:  1. Will refer to GI for consideration of colonoscopy since she has not had 1 since 2011.  2.  Need to consider surgical resection of her sigmoid colon. She is nervous about surgery. She would like to hold off. 3.  Literature provided on diverticulosis and diverticulitis. 4.  Continue Metamucil  5.   Refer to the [address]    On this date 01/06/21 I have spent 35 minutes reviewing previous notes, test results and face to face with the patient discussing the diagnosis and importance of compliance with the treatment plan. Specifically we discussed diverticulitis, diverticulosis, cognitive impairment, depression, anxiety. She was counseled on diet modifications. Discussed medications with patient who voiced understanding of their use, indication and potential side effects. Pt also understands the above recommendations. All questions answered. This note was generated completely or in part utilizing Dragon dictation speech recognition software. Occasionally, words are mistranscribed and despite editing, the text may contain inaccuracies due to incorrect word recognition.   If further clarification is needed please contact the office at [phone]       Electronically signed    Tracy Larsen D.O.

## 2021-01-19 ENCOUNTER — TELEPHONE (OUTPATIENT)
Dept: INTERNAL MEDICINE CLINIC | Age: 71
End: 2021-01-19

## 2021-01-19 DIAGNOSIS — Z11.59 NEED FOR HEPATITIS C SCREENING TEST: ICD-10-CM

## 2021-01-19 DIAGNOSIS — E05.00 GRAVES' DISEASE: ICD-10-CM

## 2021-01-19 DIAGNOSIS — Z13.220 SCREENING FOR HYPERLIPIDEMIA: ICD-10-CM

## 2021-01-19 DIAGNOSIS — Z13.1 SCREENING FOR DIABETES MELLITUS: ICD-10-CM

## 2021-01-19 LAB
A/G RATIO: 1.8 (ref 1.1–2.2)
ALBUMIN SERPL-MCNC: 4.6 G/DL (ref 3.4–5)
ALP BLD-CCNC: 122 U/L (ref 40–129)
ALT SERPL-CCNC: 10 U/L (ref 10–40)
ANION GAP SERPL CALCULATED.3IONS-SCNC: 12 MMOL/L (ref 3–16)
AST SERPL-CCNC: 16 U/L (ref 15–37)
BASOPHILS ABSOLUTE: 0.1 K/UL (ref 0–0.2)
BASOPHILS RELATIVE PERCENT: 1.8 %
BILIRUB SERPL-MCNC: 0.3 MG/DL (ref 0–1)
BUN BLDV-MCNC: 11 MG/DL (ref 7–20)
CALCIUM SERPL-MCNC: 9.9 MG/DL (ref 8.3–10.6)
CHLORIDE BLD-SCNC: 105 MMOL/L (ref 99–110)
CHOLESTEROL, TOTAL: 216 MG/DL (ref 0–199)
CO2: 25 MMOL/L (ref 21–32)
CREAT SERPL-MCNC: 0.7 MG/DL (ref 0.6–1.2)
EOSINOPHILS ABSOLUTE: 0.1 K/UL (ref 0–0.6)
EOSINOPHILS RELATIVE PERCENT: 2.5 %
GFR AFRICAN AMERICAN: >60
GFR NON-AFRICAN AMERICAN: >60
GLOBULIN: 2.6 G/DL
GLUCOSE BLD-MCNC: 84 MG/DL (ref 70–99)
HCT VFR BLD CALC: 41.9 % (ref 36–48)
HDLC SERPL-MCNC: 82 MG/DL (ref 40–60)
HEMOGLOBIN: 13.9 G/DL (ref 12–16)
LDL CHOLESTEROL CALCULATED: 114 MG/DL
LYMPHOCYTES ABSOLUTE: 1.6 K/UL (ref 1–5.1)
LYMPHOCYTES RELATIVE PERCENT: 32.2 %
MCH RBC QN AUTO: 31.7 PG (ref 26–34)
MCHC RBC AUTO-ENTMCNC: 33.2 G/DL (ref 31–36)
MCV RBC AUTO: 95.4 FL (ref 80–100)
MONOCYTES ABSOLUTE: 0.3 K/UL (ref 0–1.3)
MONOCYTES RELATIVE PERCENT: 6.9 %
NEUTROPHILS ABSOLUTE: 2.8 K/UL (ref 1.7–7.7)
NEUTROPHILS RELATIVE PERCENT: 56.6 %
PDW BLD-RTO: 13.8 % (ref 12.4–15.4)
PLATELET # BLD: 317 K/UL (ref 135–450)
PMV BLD AUTO: 8.8 FL (ref 5–10.5)
POTASSIUM SERPL-SCNC: 4.4 MMOL/L (ref 3.5–5.1)
RBC # BLD: 4.4 M/UL (ref 4–5.2)
SODIUM BLD-SCNC: 142 MMOL/L (ref 136–145)
TOTAL PROTEIN: 7.2 G/DL (ref 6.4–8.2)
TRIGL SERPL-MCNC: 102 MG/DL (ref 0–150)
TSH SERPL DL<=0.05 MIU/L-ACNC: 3.81 UIU/ML (ref 0.27–4.2)
VLDLC SERPL CALC-MCNC: 20 MG/DL
WBC # BLD: 4.9 K/UL (ref 4–11)

## 2021-01-19 NOTE — TELEPHONE ENCOUNTER
Patient is requesting a refill of levothyroxine (SYNTHROID) 25 MCG tablet to be sent to [address] [place] [address] 9 - F [phone]

## 2021-01-19 NOTE — TELEPHONE ENCOUNTER
Pt requesting a refill  her Levothyroxine. Pt was informed she needed to get her blood work done. Pt will go to lab this morning. Pt has 3 tabs levothyroxine left. She will call for refill in 2 days for new script incase there is a dose change.

## 2021-01-20 LAB
HEPATITIS C ANTIBODY INTERPRETATION: NORMAL
SARS-COV-2 ANTIBODY, TOTAL: NEGATIVE
THYROID PEROXIDASE (TPO) ABS: 11 IU/ML

## 2021-01-21 RX ORDER — LEVOTHYROXINE SODIUM 0.03 MG/1
25 TABLET ORAL DAILY
Qty: 30 TABLET | Refills: 5 | Status: SHIPPED | OUTPATIENT
Start: 2021-01-21 | End: 2021-06-29 | Stop reason: SDUPTHER

## 2021-01-21 NOTE — TELEPHONE ENCOUNTER
Patient has had her labs done and is requesting this refill be submitted.       levothyroxine (SYNTHROID) 25 MCG tablet to be sent to [address] 9 - F [phone]

## 2021-02-02 ENCOUNTER — TELEPHONE (OUTPATIENT)
Dept: INTERNAL MEDICINE CLINIC | Age: 71
End: 2021-02-02

## 2021-02-02 NOTE — TELEPHONE ENCOUNTER
Patient states she has a test scheduled for this Friday. I think that is too early. That will have only been 4 days since her exposure. Reschedule her for a test on Monday.

## 2021-02-02 NOTE — TELEPHONE ENCOUNTER
Patient was directly exposed to a friend yesterday who is COVID +. She has since called unknowingly exposed her son and his family. Patient has been scheduled for  COVID test on Friday and is wanting to be advised further as to what to do between know and then. Patient is not currently experiencing any sxs but is concerned for her Son and his family. Please contact patient to advise.

## 2021-02-08 ENCOUNTER — OFFICE VISIT (OUTPATIENT)
Dept: PRIMARY CARE CLINIC | Age: 71
End: 2021-02-08
Payer: MEDICARE

## 2021-02-08 DIAGNOSIS — Z20.822 SUSPECTED COVID-19 VIRUS INFECTION: Primary | ICD-10-CM

## 2021-02-08 PROCEDURE — 99211 OFF/OP EST MAY X REQ PHY/QHP: CPT | Performed by: NURSE PRACTITIONER

## 2021-02-08 PROCEDURE — G8420 CALC BMI NORM PARAMETERS: HCPCS | Performed by: NURSE PRACTITIONER

## 2021-02-08 PROCEDURE — G8428 CUR MEDS NOT DOCUMENT: HCPCS | Performed by: NURSE PRACTITIONER

## 2021-02-08 NOTE — PROGRESS NOTES
Chaparrita Gordon received a viral test for COVID-19. They were educated on isolation and quarantine as appropriate. For any symptoms, they were directed to seek care from their PCP, given contact information to establish with a doctor, directed to an urgent care or the emergency room.

## 2021-02-09 ENCOUNTER — TELEPHONE (OUTPATIENT)
Dept: INTERNAL MEDICINE CLINIC | Age: 71
End: 2021-02-09

## 2021-02-09 LAB — SARS-COV-2, NAA: NOT DETECTED

## 2021-02-09 NOTE — TELEPHONE ENCOUNTER
Patient was tested for COVID yesterday at [facility]. I explained it can take 2-5 days to get results. Please call her once we have the results. Thanks.

## 2021-02-22 ENCOUNTER — TELEPHONE (OUTPATIENT)
Dept: INTERNAL MEDICINE CLINIC | Age: 71
End: 2021-02-22

## 2021-02-22 NOTE — TELEPHONE ENCOUNTER
Pt called to let dr Aj Garg know she had to cancel some of her tests due to being exposed to Covid.  Pt will now reschedule

## 2021-02-22 NOTE — TELEPHONE ENCOUNTER
----- Message from [facility] sent at 2/19/2021  4:00 PM EST -----  Subject: Message to Provider    QUESTIONS  Information for Provider? pt had a colonoscopy and she had to cancel it. She would like to talk to Dr. Yomi Garcia. She would like to have Dr. Yomi Garcia   call her to go over some things with her. She would like to be called on   Monday.   ---------------------------------------------------------------------------  --------------  CALL BACK INFO  What is the best way for the office to contact you? OK to leave message on   voicemail  Preferred Call Back Phone Number? [phone]  ---------------------------------------------------------------------------  --------------  SCRIPT ANSWERS  Relationship to Patient?  Self

## 2021-03-11 ENCOUNTER — HOSPITAL ENCOUNTER (OUTPATIENT)
Dept: GENERAL RADIOLOGY | Age: 71
Discharge: HOME | End: 2021-03-11
Payer: MEDICARE

## 2021-03-11 DIAGNOSIS — Z78.0 POSTMENOPAUSAL: ICD-10-CM

## 2021-03-11 PROCEDURE — 77080 DXA BONE DENSITY AXIAL: CPT

## 2021-04-21 ENCOUNTER — TELEPHONE (OUTPATIENT)
Dept: INTERNAL MEDICINE CLINIC | Age: 71
End: 2021-04-21

## 2021-04-21 NOTE — TELEPHONE ENCOUNTER
----- Message from Rosi Rodriguez sent at 4/20/2021  4:51 PM EDT -----  Subject: Message to Provider    QUESTIONS  Information for Provider? pt needs a call back and help to set up an appt   for MAMMOGRAPHY . she did just get the covid shot and needs to wait for   that time fram but not sure what the time fame is exactly please call to   further assist and help to make that appt   ---------------------------------------------------------------------------  --------------  [address]  What is the best way for the office to contact you? OK to leave message on   voicemail  Preferred Call Back Phone Number? [phone]  ---------------------------------------------------------------------------  --------------  SCRIPT ANSWERS  Relationship to Patient?  Self

## 2021-05-25 ENCOUNTER — HOSPITAL ENCOUNTER (OUTPATIENT)
Dept: MAMMOGRAPHY | Age: 71
Discharge: HOME | End: 2021-05-25
Payer: MEDICARE

## 2021-05-25 VITALS — WEIGHT: 161 LBS | HEIGHT: 69 IN | BODY MASS INDEX: 23.85 KG/M2

## 2021-05-25 DIAGNOSIS — Z12.31 VISIT FOR SCREENING MAMMOGRAM: ICD-10-CM

## 2021-05-25 PROCEDURE — 77063 BREAST TOMOSYNTHESIS BI: CPT

## 2021-06-15 ENCOUNTER — TELEPHONE (OUTPATIENT)
Dept: INTERNAL MEDICINE CLINIC | Age: 71
End: 2021-06-15

## 2021-06-15 NOTE — TELEPHONE ENCOUNTER
----- Message from Bhumi Puckett sent at 6/15/2021  1:17 PM EDT -----  Subject: Referral Request    QUESTIONS   Reason for referral request? colonoscopy   Has the physician seen you for this condition before? No   Preferred Specialist (if applicable)? Do you already have an appointment scheduled? No  Additional Information for Provider? Pt wants someone at [facility] /   [place] area  ---------------------------------------------------------------------------  --------------  [address]  What is the best way for the office to contact you? OK to leave message on   voicemail  Preferred Call Back Phone Number?  [phone]

## 2021-06-16 ENCOUNTER — TELEPHONE (OUTPATIENT)
Dept: INTERNAL MEDICINE CLINIC | Age: 71
End: 2021-06-16

## 2021-06-16 NOTE — TELEPHONE ENCOUNTER
Patient is requesting to know if it's time to get her thyroid checked, and if so, please generate an order. Please contact patient @ phone # provided. Her computer is down, so please don't contact her any other way than via her phone.

## 2021-06-29 ENCOUNTER — TELEPHONE (OUTPATIENT)
Dept: INTERNAL MEDICINE CLINIC | Age: 71
End: 2021-06-29

## 2021-06-29 RX ORDER — LEVOTHYROXINE SODIUM 0.03 MG/1
25 TABLET ORAL DAILY
Qty: 30 TABLET | Refills: 5 | Status: SHIPPED | OUTPATIENT
Start: 2021-06-29 | End: 2021-09-23 | Stop reason: SDUPTHER

## 2021-06-29 NOTE — TELEPHONE ENCOUNTER
Patient called to inquire about a colonoscopy previously discussed with dr Obdulia Garcia she would like to know exactly how she is supposed to go about getting scheduled for this colonoscopy. She also need a refill of the following medication if possible.       levothyroxine (SYNTHROID) 25 MCG tablet Take 1 tablet by mouth Daily      Please send to the following pharmacy:    [facility] [address] [place] [address]

## 2021-07-13 ENCOUNTER — OFFICE VISIT (OUTPATIENT)
Dept: INTERNAL MEDICINE CLINIC | Age: 71
End: 2021-07-13
Payer: MEDICARE

## 2021-07-13 VITALS
BODY MASS INDEX: 23.7 KG/M2 | SYSTOLIC BLOOD PRESSURE: 138 MMHG | RESPIRATION RATE: 16 BRPM | OXYGEN SATURATION: 98 % | HEIGHT: 69 IN | DIASTOLIC BLOOD PRESSURE: 70 MMHG | HEART RATE: 92 BPM | WEIGHT: 160 LBS

## 2021-07-13 DIAGNOSIS — E05.00 GRAVES' DISEASE: ICD-10-CM

## 2021-07-13 DIAGNOSIS — K57.90 DIVERTICULOSIS: Primary | ICD-10-CM

## 2021-07-13 DIAGNOSIS — R41.89 COGNITIVE IMPAIRMENT: ICD-10-CM

## 2021-07-13 DIAGNOSIS — M85.80 OSTEOPENIA, UNSPECIFIED LOCATION: ICD-10-CM

## 2021-07-13 DIAGNOSIS — E03.9 HYPOTHYROIDISM, UNSPECIFIED TYPE: ICD-10-CM

## 2021-07-13 DIAGNOSIS — R10.13 DYSPEPSIA: ICD-10-CM

## 2021-07-13 DIAGNOSIS — Z87.19 HISTORY OF COLONIC DIVERTICULITIS: ICD-10-CM

## 2021-07-13 PROBLEM — Z86.19 HISTORY OF SHINGLES: Status: ACTIVE | Noted: 2021-07-13

## 2021-07-13 PROBLEM — Z86.19 HISTORY OF SHINGLES: Status: RESOLVED | Noted: 2021-07-13 | Resolved: 2021-07-13

## 2021-07-13 PROCEDURE — 1090F PRES/ABSN URINE INCON ASSESS: CPT | Performed by: INTERNAL MEDICINE

## 2021-07-13 PROCEDURE — 99214 OFFICE O/P EST MOD 30 MIN: CPT | Performed by: INTERNAL MEDICINE

## 2021-07-13 PROCEDURE — G8420 CALC BMI NORM PARAMETERS: HCPCS | Performed by: INTERNAL MEDICINE

## 2021-07-13 PROCEDURE — G8399 PT W/DXA RESULTS DOCUMENT: HCPCS | Performed by: INTERNAL MEDICINE

## 2021-07-13 PROCEDURE — 3017F COLORECTAL CA SCREEN DOC REV: CPT | Performed by: INTERNAL MEDICINE

## 2021-07-13 PROCEDURE — G8427 DOCREV CUR MEDS BY ELIG CLIN: HCPCS | Performed by: INTERNAL MEDICINE

## 2021-07-13 PROCEDURE — 4040F PNEUMOC VAC/ADMIN/RCVD: CPT | Performed by: INTERNAL MEDICINE

## 2021-07-13 PROCEDURE — 1036F TOBACCO NON-USER: CPT | Performed by: INTERNAL MEDICINE

## 2021-07-13 PROCEDURE — 1123F ACP DISCUSS/DSCN MKR DOCD: CPT | Performed by: INTERNAL MEDICINE

## 2021-07-13 SDOH — ECONOMIC STABILITY: TRANSPORTATION INSECURITY
IN THE PAST 12 MONTHS, HAS THE LACK OF TRANSPORTATION KEPT YOU FROM MEDICAL APPOINTMENTS OR FROM GETTING MEDICATIONS?: NO

## 2021-07-13 SDOH — ECONOMIC STABILITY: TRANSPORTATION INSECURITY
IN THE PAST 12 MONTHS, HAS LACK OF TRANSPORTATION KEPT YOU FROM MEETINGS, WORK, OR FROM GETTING THINGS NEEDED FOR DAILY LIVING?: NO

## 2021-07-13 SDOH — ECONOMIC STABILITY: HOUSING INSECURITY
IN THE LAST 12 MONTHS, WAS THERE A TIME WHEN YOU DID NOT HAVE A STEADY PLACE TO SLEEP OR SLEPT IN A SHELTER (INCLUDING NOW)?: NO

## 2021-07-13 SDOH — ECONOMIC STABILITY: FOOD INSECURITY: WITHIN THE PAST 12 MONTHS, THE FOOD YOU BOUGHT JUST DIDN'T LAST AND YOU DIDN'T HAVE MONEY TO GET MORE.: NEVER TRUE

## 2021-07-13 SDOH — ECONOMIC STABILITY: FOOD INSECURITY: WITHIN THE PAST 12 MONTHS, YOU WORRIED THAT YOUR FOOD WOULD RUN OUT BEFORE YOU GOT MONEY TO BUY MORE.: NEVER TRUE

## 2021-07-13 SDOH — ECONOMIC STABILITY: INCOME INSECURITY: IN THE LAST 12 MONTHS, WAS THERE A TIME WHEN YOU WERE NOT ABLE TO PAY THE MORTGAGE OR RENT ON TIME?: NO

## 2021-07-13 ASSESSMENT — PATIENT HEALTH QUESTIONNAIRE - PHQ9
SUM OF ALL RESPONSES TO PHQ QUESTIONS 1-9: 0
SUM OF ALL RESPONSES TO PHQ QUESTIONS 1-9: 0
2. FEELING DOWN, DEPRESSED OR HOPELESS: 0
SUM OF ALL RESPONSES TO PHQ9 QUESTIONS 1 & 2: 0
1. LITTLE INTEREST OR PLEASURE IN DOING THINGS: 0
SUM OF ALL RESPONSES TO PHQ QUESTIONS 1-9: 0

## 2021-07-13 ASSESSMENT — SOCIAL DETERMINANTS OF HEALTH (SDOH)
HOW HARD IS IT FOR YOU TO PAY FOR THE VERY BASICS LIKE FOOD, HOUSING, MEDICAL CARE, AND HEATING?: NOT HARD AT ALL
WITHIN THE LAST YEAR, HAVE YOU BEEN AFRAID OF YOUR PARTNER OR EX-PARTNER?: NO
WITHIN THE LAST YEAR, HAVE YOU BEEN HUMILIATED OR EMOTIONALLY ABUSED IN OTHER WAYS BY YOUR PARTNER OR EX-PARTNER?: NO
WITHIN THE LAST YEAR, HAVE TO BEEN RAPED OR FORCED TO HAVE ANY KIND OF SEXUAL ACTIVITY BY YOUR PARTNER OR EX-PARTNER?: NO
WITHIN THE LAST YEAR, HAVE YOU BEEN KICKED, HIT, SLAPPED, OR OTHERWISE PHYSICALLY HURT BY YOUR PARTNER OR EX-PARTNER?: NO

## 2021-07-13 NOTE — PATIENT INSTRUCTIONS
· You have shoulder pain.     · You have cramping pain that does not get better when you have a bowel movement or pass gas.     · You pass gas or stool from your urethra while urinating. Watch closely for changes in your health, and be sure to contact your doctor if you have any problems. Where can you learn more? Go to [email or web address] and sign in to your Barspace account. Enter D936 in the Invodo box to learn more about \"Diverticulosis: Care Instructions. \"     If you do not have an account, please click on the \"Sign Up Now\" link. Current as of: February 10, 2021               Content Version: 12.9  © 2006-2021 Spot Runner. Care instructions adapted under license by [facility] ([facility]). If you have questions about a medical condition or this instruction, always ask your healthcare professional. [place] [address] any warranty or liability for your use of this information. Patient Education        Learning About Diverticulosis and Diverticulitis  What are diverticulosis and diverticulitis? In diverticulosis and diverticulitis, pouches called diverticula form in the wall of the large intestine, or colon. · In diverticulosis, the pouches do not cause any pain or other symptoms. · In diverticulitis, the pouches get inflamed or infected and cause symptoms. Doctors aren't sure what causes these pouches in the colon. But they think that a low-fiber diet may play a role. Without fiber to add bulk to the stool, the colon has to work harder than normal to push the stool forward. The pressure from this may cause pouches to form in weak spots along the colon. Some people with diverticulosis get diverticulitis. But experts don't know why this happens. What are the symptoms? · In diverticulosis, most people don't have symptoms. But pouches sometimes bleed. · In diverticulitis, symptoms may last from a few hours to a week or more.  They include:  ? Belly pain. This is usually in the lower left side. It is sometimes worse when you move. This is the most common symptom. ? Fever and chills. ? Bloating and gas. ? Diarrhea or constipation. ? Nausea and sometimes vomiting.  ? Not feeling like eating. How can you prevent diverticulitis? You may be able to lower your chance of getting diverticulitis. You can do this by taking steps to prevent constipation. · Eat fruits, vegetables, beans, and whole grains every day. These foods are high in fiber. · Drink plenty of fluids. If you have kidney, heart, or liver disease and have to limit fluids, talk with your doctor before you increase the amount of fluids you drink. · Get at least 30 minutes of exercise on most days of the week. Walking is a good choice. You also may want to do other activities, such as running, swimming, cycling, or playing tennis or team sports. · Take a fiber supplement, such as Citrucel or Metamucil, every day if needed. Read and follow all instructions on the label. · Schedule time each day for a bowel movement. Having a daily routine may help. Take your time and do not strain when having a bowel movement. Some people avoid nuts, seeds, berries, and popcorn. They believe that these foods might get trapped in the diverticula and cause pain. But there is no proof that these foods cause diverticulitis or make it worse. How are these problems treated? · The best way to treat diverticulosis is to avoid constipation. · Treatment for diverticulitis includes antibiotics. It often includes a change in your diet. You may need only liquids at first. Your doctor may suggest pain medicines for pain or belly cramps. In some cases, surgery may be needed. Follow-up care is a key part of your treatment and safety. Be sure to make and go to all appointments, and call your doctor if you are having problems.  It's also a good idea to know your test results and keep a list of the medicines you take.  Where can you learn more? Go to [email or web address] and sign in to your Sure2Sign Recruiting account. Enter L768 in the [facility] box to learn more about \"Learning About Diverticulosis and Diverticulitis. \"     If you do not have an account, please click on the \"Sign Up Now\" link. Current as of: February 10, 2021               Content Version: 12.9  © 2006-2021 VoxPop Clothing. Care instructions adapted under license by [facility] ([facility]). If you have questions about a medical condition or this instruction, always ask your healthcare professional. [place] [address] any warranty or liability for your use of this information. Patient Education        Hyperthyroidism: Care Instructions  Your Care Instructions  Hyperthyroidism occurs when the thyroid gland makes too much thyroid hormone. This speeds up your metabolism--how your body uses energy. This condition can cause you to be very active, lose weight, and have sleep problems, eye problems, and a fast heart rate. It can also cause a goiter. A goiter is an enlarged thyroid gland that you can see at the front of the neck. Hyperthyroidism is often caused by Graves' disease. In Graves' disease, the body's defense (immune) system attacks the thyroid gland. Your doctor may prescribe a beta-blocker medicine to slow your pulse and calm you down. But this is not a treatment for hyperthyroidism. It is given for your fast heart rate. Your doctor may also give you antithyroid medicine. This medicine keeps excess thyroid hormone in check. In some cases, doctors recommend radioactive iodine or surgery to remove the thyroid. After either of these treatments, you may need to take medicine to replace thyroid hormone for the rest of your life. Follow-up care is a key part of your treatment and safety. Be sure to make and go to all appointments, and call your doctor if you are having problems.  It's also a good idea to know your test results and keep a list of the medicines you take. How can you care for yourself at home? · Take your medicines exactly as prescribed. You need to take the thyroid medicine at the same time each day. Call your doctor if you think you are having a problem with your medicine. · Graves' disease can make your eyes sore. Use artificial tears, eye drops, and sunglasses to protect your eyes from dryness, wind, and sun. Raise your head with pillows at night to prevent your eyes from swelling. In some cases, taping your eyelids shut at night will keep your eyes from being dry in the morning. · Make sure you get enough calcium. Foods that are rich in calcium include milk, yogurt, cheese, and dark green vegetables. · If you need to gain weight, ask your doctor about special diets. · Do not eat kelp. Alla Cabello is high in iodine, which can make hyperthyroidism worse. Lala Cabello is commonly used in Kids [employer] and other [place] foods. You can use iodized salt and eat bread and seafood. Try to eat a balanced diet. · Do not use caffeine and other stimulants. These can make symptoms worse, such as a fast heartbeat, nervousness, and problems focusing. · Do not smoke. Smoking can make your condition worse and may lead to more serious eye problems. If you need help quitting, talk to your doctor about stop-smoking programs and medicines. These can increase your chances of quitting for good. · Lower your stress. Learn to use biofeedback, guided imagery, meditation, or other methods to relax. · Use creams or ointments for irritated skin. Ask your doctor which type to use. · Tell all your doctors about your condition. They need to know because some medicines contain iodine. When should you call for help? Call your doctor now or seek immediate medical care if:    · You have symptoms of a sudden, very high thyroid level (thyroid storm). These include:  ? Being nauseated, vomiting, and having diarrhea. ? Sweating a lot. ?  Feeling extremely restless and confused. ? Having a high fever. ? Having a fast heartbeat.     · You have sudden vision changes or eye pain.     · You have a fever or severe sore throat and are taking antithyroid medicines, such as PTU or methimazole. Watch closely for changes in your health, and be sure to contact your doctor if:    · You have a sore throat or have problems swallowing.     · You have swollen, itchy, or red eyes or your other eye symptoms get worse, or you have new vision problems.     · You have signs of a low thyroid level (hypothyroidism). You may feel very tired, confused, or weak. Where can you learn more? Go to [email or web address] and sign in to your Cutetown account. Enter V694 in the SupplySeeker.com box to learn more about \"Hyperthyroidism: Care Instructions. \"     If you do not have an account, please click on the \"Sign Up Now\" link. Current as of: March 31, 2020               Content Version: 12.9  © 2006-2021 ObjectWay. Care instructions adapted under license by [facility] ([facility]). If you have questions about a medical condition or this instruction, always ask your healthcare professional. [place] [address] any warranty or liability for your use of this information. Patient Education        Hypothyroidism: Care Instructions  Your Care Instructions     When you have hypothyroidism, your body doesn't make enough thyroid hormone. This hormone helps your body use energy. If your thyroid level is low, you may feel tired, be constipated, have an increase in your blood pressure, or have dry skin or memory problems. You may also get cold easily, even when it is warm. Women with low thyroid levels may have heavy menstrual periods. A blood test to find your thyroid-stimulating hormone (TSH) level is used to check for hypothyroidism. A high TSH level may mean that you have it. The treatment for hypothyroidism is thyroid hormone pills. You should start to feel better in 1 to 2 weeks. Most people need treatment for the rest of their lives. You will need regular visits with your doctor to make sure you are doing well and that you have the right dose of medicine. Follow-up care is a key part of your treatment and safety. Be sure to make and go to all appointments, and call your doctor if you are having problems. It's also a good idea to know your test results and keep a list of the medicines you take. How can you care for yourself at home? · Take your thyroid hormone medicine exactly as prescribed. Call your doctor if you think you are having a problem with your medicine. Most people do not have side effects if they take the right amount of medicine regularly. ? Take the medicine 30 minutes before breakfast, and do not take it with calcium, vitamins, or iron. ? Do not take extra doses of your thyroid medicine. It will not help you get better any faster, and it may cause side effects. ? If you forget to take a dose, do NOT take a double dose of medicine. Take your usual dose the next day. · Tell your doctor about all prescription, herbal, or over-the-counter products you take. · Take care of yourself. Eat a healthy diet, get enough sleep, and get regular exercise. When should you call for help? Call 911 anytime you think you may need emergency care. For example, call if:    · You passed out (lost consciousness).     · You have severe trouble breathing.     · You have a very slow heartbeat (less than 60 beats a minute).     · You have a low body temperature (95°F or below). Call your doctor now or seek immediate medical care if:    · You feel tired, sluggish, or weak.     · You have trouble remembering things or concentrating.     · You do not begin to feel better 2 weeks after starting your medicine. Watch closely for changes in your health, and be sure to contact your doctor if you have any problems. Where can you learn more?   Go to [email or web address] and sign in to your [email or web address] account. Enter J298 in the [facility] box to learn more about \"Hypothyroidism: Care Instructions. \"     If you do not have an account, please click on the \"Sign Up Now\" link. Current as of: March 31, 2020               Content Version: 12.9  © 3888-6590 SportSquare Games. Care instructions adapted under license by [facility] ([facility]). If you have questions about a medical condition or this instruction, always ask your healthcare professional. [place] [address] any warranty or liability for your use of this information. Patient Education        Learning About Mild Cognitive Impairment (MCI)  What is mild cognitive impairment (MCI)? It's common to forget things sometimes as we get older. But some older people have memory loss that's more than normal aging but less than dementia. They have what's called mild cognitive impairment, or MCI. People with the condition often know that their memory or mental function has changed. Tests may show some loss. But their minds work well overall. They can carry out daily tasks that are normal for them. People with MCI have a higher chance of one day getting dementia. But not all people who have it will get dementia. Some people may stay the same over time. What are the symptoms? People with MCI have more memory loss than what occurs with normal aging. They may have increasing trouble with recalling words and keeping up with conversations. They may also have trouble remembering important events and making decisions. What puts you at risk? The risk of getting MCI increases with age. Having high blood pressure or having a family history of MCI may also increase your risk. How is it diagnosed? Your doctor will do a physical exam.  You may be asked questions to check your memory and other mental skills. Your doctor may also talk to close friends and family members. This can help the doctor figure out how your memory and other mental skills have changed. You may get blood tests and tests that look at your brain. These questions and tests can make sure you don't have other conditions that can cause symptoms like MCI. These include depression, sleep problems, and side effects from medicines. How is it treated? There are no medicines to treat MCI or to keep it from progressing to dementia. But treating conditions like high blood pressure and diabetes may help. A person with MCI needs routine follow-up visits with their doctor to check on changes in the person's mental skills. How can you care for yourself at home? Keeping your body active can help slow MCI. Exercises like walking can help. Try to stay active mentally too. Read or do things like crossword puzzles if you enjoy doing them. It's common to feel scared, anxious, or depressed. Let yourself grieve if you need to. You may want to get emotional support from family, friends, a support group, or a counselor who works with people who have [address] Though the future isn't always clear, it can be good to plan ahead with instructions for your care. These are called advanced directives. Having a plan can help make sure that you get the care you want. Current as of: January 15, 2021               Content Version: 12.9  © 2006-2021 Healthwise, Incorporated. Care instructions adapted under license by [facility] ([facility]). If you have questions about a medical condition or this instruction, always ask your healthcare professional. [place] [address] any warranty or liability for your use of this information.

## 2021-07-13 NOTE — PROGRESS NOTES
[facility]) Physicians  Internal Medicine  Patient Encounter  Gennette Landau, D.O., [facility]        Chief Complaint   Patient presents with   Kori Mcgee    Medication Check    Results       HPI: 69 y.o. female who has not been seen since January 2021 presents today for a routine checkup regarding the status of current chronic medical problems as the below along with a medication review and reconciliation. When she was seen here in January 2021 she had been to the ER having been diagnosed with acute diverticulitis. She has had multiple bouts of diverticulitis over the years. Patient was referred to GI for consideration of a colonoscopy since she had not had one for 10 years. It does not appear that she follow through with this. They also did not schedule an appointment with the neuropsychologist.    Other medical problems include Graves' disease (thyrotoxicosis), hypercholesterolemia, diverticulosis, osteopenia, carpal tunnel syndrome, cognitive impairment. We reviewed her mammogram from 5/25/2021. This was normal.  Recommended annual recheck. We reviewed her DEXA scan from 3/11/2021. This showed slight worsening of her osteopenia. Her FRAX score was low not warranting pharmaceutical therapy. Calcium and vitamin D supplements along with weightbearing exercise were recommended. She exercises regularly. Today, she reports episodes of epigastric discomfort when she is nervous or anxious. She does exercise regularly without difficulty. She plays Pickle ball without difficulty. She used to take the Zantac. She now takes Pepcid which helps. She denies dysphagia. Reviewed records from [address]212-- Dr. Marni Rogers. She was diagnosed with Grave's in 2005. She was treated with Methimazole. She had a relapse in 2007. It appears that she became hypothyroid while on methimazole back in 2012. She had tried to come off methimazole in the past but had a relapse.   Somewhere along the way she was successful in coming off of the methimazole. She has been off this for several years. Past Medical History:   Diagnosis Date    Bilateral carpal tunnel syndrome     Diverticula of colon     Diverticulitis 08/2019   Melissalady Hessnco' disease     History of shingles 7/13/2021    Formatting of this note might be different from the original. left side forehead and scalp, had eye exam    Hyperthyroidism     Migraine 9/24/2014    Rotator cuff tear, right 12/5/2014    Thyroid disease      Past Surgical History:   Procedure Laterality Date    APPENDECTOMY      CATARACT REMOVAL WITH IMPLANT Bilateral     FOOT SURGERY Bilateral     HYSTERECTOMY  2009    ROTATOR CUFF REPAIR Right         MEDICATIONS:  Medication Sig   levothyroxine (SYNTHROID) 25 MCG tablet Take 1 tablet by mouth Daily   acetaminophen (TYLENOL) 325 MG tablet Take 2 tablets by mouth every 6 hours as needed for Pain   docusate sodium (COLACE) 100 MG capsule Take 1 capsule by mouth 2 times daily   aspirin 81 MG EC tablet Take 81 mg by mouth daily   VITAMIN E PO Take by mouth   Ascorbic Acid (VITAMIN C PO) Take by mouth   VITAMIN D PO Take by mouth   Multiple Vitamin (MULTI-VITAMIN DAILY PO) Take by mouth   ondansetron (ZOFRAN ODT) 4 MG disintegrating tablet Take 1 tablet by mouth every 8 hours as needed for Nausea  Patient not taking: Reported on 1/6/2021           Review of Systems - As per HPI  GEN: Pt denies fever, chills or night sweats. Denies weight changes. Appetite good  HEENT: Pt denies visual and auditory changes, epistaxis, upper respiratory symptoms and dysphagia  CV: Pt denies CP, SOB, COONEY, orthopnea palpitations, LE swelling, and claudication. PULM: Pt denies cough, wheezing or sputum production  GI: Pt denies N/V/D, heart burn, rectal bleeding, or melena. NEURO: Pt denies unilateral weakness, paresthesia and dizziness.     OBJECTIVE:  Vitals:    07/13/21 0940 07/13/21 1028   BP: (!) 142/70 138/70   Pulse: 92    Resp: 16    SpO2: 98% Weight: 160 lb (72.6 kg)    Height: 5' 9\" (1.753 m)      Body mass index is 23.63 kg/m². Wt Readings from Last 3 Encounters:   07/13/21 160 lb (72.6 kg)   05/25/21 161 lb (73 kg)   01/06/21 161 lb (73 kg)     BP Readings from Last 3 Encounters:   07/13/21 138/70   01/06/21 126/70   12/27/20 (!) 122/51        GEN: NAD, A&O, Non-toxic  HEENT: NC/AT, NELIA, EOMI, Oral cavity Clear,  TM's NL, Nasal cavity clear. NECK: Supple. No thyromegaly. No JVD  LYMPH: No C/SC nodes. CV: S1 S2 NL, RRR. No murmurs, clicks or rubs. PULM: CTA, symmentric air exchange  EXT: No C/C/E  GI: Abdomen is soft, NT, BS+, No hepatomegaly. No masses. NEURO: No focal or lateralizing deficits. VASC:  No carotid bruits. Pulses symmetric  SKIN:  No rashes or lesions of concern      ASSESSMENT/PLAN:    1. Diverticulosis  Patient is asymptomatic at this time  Consultation with GI to determine whether or not a colonoscopy is warranted  - IVAN Brown MD, Gastroenterology, [facility]    2. History of colonic diverticulitis  Bout from December 2020 has resolved  Continue Metamucil  - IVAN Brown MD, Gastroenterology, [facility]    3. Dyspepsia  Condition is stable  Continue Pepcid as needed as this is very effective for her    4. Graves' disease  Condition is stable without signs of recurrence  Recheck lab  - TSH without Reflex; Future  - T4, FREE; Future  - T3, FREE; Future    5. Hypothyroidism, unspecified type  Condition stability is uncertain. Due for lab  - TSH without Reflex; Future  - T4, FREE; Future  - T3, FREE; Future    6. Osteopenia, unspecified  Continue calcium, vitamin D, weightbearing exercise. Repeat DEXA scan in 1 to 2 years    7. Cognitive impairment  Recommend neuropsychological testing.     On this date 7/13/2021 I have spent 35 minutes reviewing previous notes, test results and face to face with the patient discussing the diagnosis and importance of compliance with the treatment plan as well as documenting on the day of the visit. Patient counseled on thyroid disease-pathophysiology and therapeutics. We discussed her history of Graves' disease at length. We also reviewed records from Dr. Willa Mock in years past.    Discussed medications with patient who voiced understanding of their use, indication and potential side effects. Pt also understands the above recommendations. All questions answered. This note was generated completely or in part utilizing Dragon dictation speech recognition software. Occasionally, words are mistranscribed and despite editing, the text may contain inaccuracies due to incorrect word recognition.   If further clarification is needed please contact the office at [phone]       Electronically signed    Sugar Blanco D.O.

## 2021-08-24 ENCOUNTER — TELEPHONE (OUTPATIENT)
Dept: INTERNAL MEDICINE CLINIC | Age: 71
End: 2021-08-24

## 2021-08-24 NOTE — TELEPHONE ENCOUNTER
Spoke to patients son Rosana Núñez regarding message. He said she is not having any symptoms that she would need to go to ED. She will be in [place] till Nov. Helping with grandchildren and she will make appt when she returns.

## 2021-08-24 NOTE — TELEPHONE ENCOUNTER
Patient is currently in [facility] visiting her son. She is calling with C/O of memory loss. She came to visit her son to assist with  and finds that she is having difficulty remembering what her son's instructions are. He has to write thing down for her and she says she has to keep looking at it because she can't keep it in her memory. She states she went to [address] [place] [address] to help care for her son's children but finds that she is the one being cared for. She lost her  right before COVID then stay at home for a year because of COVID and feels that this really did a number on her a number on her mentally.     Patient can be reached at her Cell phone on record or her son's home at [phone]

## 2021-08-27 ENCOUNTER — TELEPHONE (OUTPATIENT)
Dept: INTERNAL MEDICINE CLINIC | Age: 71
End: 2021-08-27

## 2021-09-22 NOTE — TELEPHONE ENCOUNTER
Last appointment: 7/13/2021  Next appointment: 1/14/2022  Last refill: 30 with 5 06/29/2021 Please send to new pharmacy

## 2021-09-22 NOTE — TELEPHONE ENCOUNTER
Patient called to have the following medication refilled and sent to that pharmacy near her in [facility] [place]. She is there looking after grandchildren and doesn't know when she will be returning.       levothyroxine (SYNTHROID) 25 MCG tablet Take 1 tablet by mouth Daily         [facility] [address], [address] [address] [phone] - F [phone]

## 2021-09-23 RX ORDER — LEVOTHYROXINE SODIUM 0.03 MG/1
25 TABLET ORAL DAILY
Qty: 30 TABLET | Refills: 5 | Status: SHIPPED | OUTPATIENT
Start: 2021-09-23 | End: 2022-01-13 | Stop reason: SDUPTHER

## 2022-01-13 ENCOUNTER — OFFICE VISIT (OUTPATIENT)
Dept: INTERNAL MEDICINE CLINIC | Age: 72
End: 2022-01-13
Payer: MEDICARE

## 2022-01-13 VITALS
HEART RATE: 92 BPM | RESPIRATION RATE: 12 BRPM | OXYGEN SATURATION: 97 % | BODY MASS INDEX: 23.11 KG/M2 | SYSTOLIC BLOOD PRESSURE: 124 MMHG | DIASTOLIC BLOOD PRESSURE: 82 MMHG | HEIGHT: 69 IN | WEIGHT: 156 LBS

## 2022-01-13 DIAGNOSIS — E78.5 HYPERLIPIDEMIA, UNSPECIFIED HYPERLIPIDEMIA TYPE: ICD-10-CM

## 2022-01-13 DIAGNOSIS — E05.00 GRAVES' DISEASE: Primary | ICD-10-CM

## 2022-01-13 DIAGNOSIS — K57.90 DIVERTICULOSIS: ICD-10-CM

## 2022-01-13 DIAGNOSIS — R41.3 MEMORY PROBLEM: ICD-10-CM

## 2022-01-13 DIAGNOSIS — R73.9 HYPERGLYCEMIA: ICD-10-CM

## 2022-01-13 PROCEDURE — 1090F PRES/ABSN URINE INCON ASSESS: CPT | Performed by: INTERNAL MEDICINE

## 2022-01-13 PROCEDURE — G8399 PT W/DXA RESULTS DOCUMENT: HCPCS | Performed by: INTERNAL MEDICINE

## 2022-01-13 PROCEDURE — 99214 OFFICE O/P EST MOD 30 MIN: CPT | Performed by: INTERNAL MEDICINE

## 2022-01-13 PROCEDURE — G8420 CALC BMI NORM PARAMETERS: HCPCS | Performed by: INTERNAL MEDICINE

## 2022-01-13 PROCEDURE — 4040F PNEUMOC VAC/ADMIN/RCVD: CPT | Performed by: INTERNAL MEDICINE

## 2022-01-13 PROCEDURE — G8482 FLU IMMUNIZE ORDER/ADMIN: HCPCS | Performed by: INTERNAL MEDICINE

## 2022-01-13 PROCEDURE — 3017F COLORECTAL CA SCREEN DOC REV: CPT | Performed by: INTERNAL MEDICINE

## 2022-01-13 PROCEDURE — 1036F TOBACCO NON-USER: CPT | Performed by: INTERNAL MEDICINE

## 2022-01-13 PROCEDURE — 1123F ACP DISCUSS/DSCN MKR DOCD: CPT | Performed by: INTERNAL MEDICINE

## 2022-01-13 PROCEDURE — G8427 DOCREV CUR MEDS BY ELIG CLIN: HCPCS | Performed by: INTERNAL MEDICINE

## 2022-01-13 RX ORDER — LEVOTHYROXINE SODIUM 0.03 MG/1
25 TABLET ORAL DAILY
Qty: 90 TABLET | Refills: 3 | Status: SHIPPED | OUTPATIENT
Start: 2022-01-13

## 2022-01-13 NOTE — PROGRESS NOTES
[facility]) Physicians  Internal Medicine  Patient Encounter  Ladd Spatz, D.O., Lew padron        Chief Complaint   Patient presents with   [facility] Check-Up     discuss memory issues     Medication Check       HPI: 66 y.o. female seen today requesting routine checkup regarding the status of current chronic medical problems as to below along with a medication review and reconciliation. She has multiple medical problems including but not limited to Graves' disease (thyrotoxicosis), hypercholesterolemia, diverticulosis, diverticulitis x3, osteopenia, carpal tunnel syndrome, cognitive impairment. Health maintenance items overdue:  Shingles vaccine  AWV    At her last visit in July we discussed cognitive impairment and memory issues. She was referred for neuropsychological testing back in 1/2021. This did not occur. She had to go down to [place] to help her brother. She is requesting a refill on her levothyroxine for post ablation hypothyroidism. With regards to her memory, she states she is struggling with short term memory. He  states she is repetitive. She asks the same questions over and over despite having been given the answer. Her son states the memory problem is interfering her life. She is is still functional. She still drives. She is able to do her daily routine, but is forgetting that she has done something or that she has something to do. She lives alone. She is able to cook simple things. She was able to cook TG dinner in 2020. This memory issue started back in fall of 2017. Her son states the problem has regressed significantly. She does not wander. She has not gotten lost.  She denies car accidents. Mood is good. She denies tremor, orthostatic dizziness, dysphagia. She has not had any recurrent abd pain. She is on metamucil daily which helps. She states she has a tooth ache. She has a headache from this.   She is trying to get into the dentist.      Patient denies any chest pain, shortness of breath, polyuria polydipsia. She has had no weight changes. She denies any blurry vision. Past Medical History:   Diagnosis Date    Bilateral carpal tunnel syndrome     Diverticula of colon     Diverticulitis 08/2019    Diverticulitis 12/27/2020    Diverticulitis 10/06/2016    Diverticulitis large intestine w/o perforation or abscess w/o bleeding 06/23/2017   Anaid Hanson' disease     History of shingles 07/13/2021    Formatting of this note might be different from the original. left side forehead and scalp, had eye exam    Hyperthyroidism     Migraine 09/24/2014    Rotator cuff tear, right 12/05/2014     Past Surgical History:   Procedure Laterality Date    APPENDECTOMY      CATARACT REMOVAL WITH IMPLANT Bilateral     FOOT SURGERY Bilateral     HYSTERECTOMY  2009    ROTATOR CUFF REPAIR Right         MEDICATIONS:  Medication Sig   levothyroxine (SYNTHROID) 25 MCG tablet Take 1 tablet by mouth Daily   acetaminophen (TYLENOL) 325 MG tablet Take 2 tablets by mouth every 6 hours as needed for Pain   aspirin 81 MG EC tablet Take 81 mg by mouth daily   VITAMIN E PO Take by mouth   Ascorbic Acid (VITAMIN C PO) Take by mouth   VITAMIN D PO Take by mouth   Multiple Vitamin (MULTI-VITAMIN DAILY PO) Take by mouth   docusate sodium (COLACE) 100 MG capsule Take 1 capsule by mouth 2 times daily            Review of Systems - As per HPI      OBJECTIVE:  Vitals:    01/13/22 0956   BP: 124/82   Pulse: 92   Resp: 12   SpO2: 97%   Weight: 156 lb (70.8 kg)   Height: 5' 9\" (1.753 m)     Body mass index is 23.04 kg/m². Wt Readings from Last 3 Encounters:   01/13/22 156 lb (70.8 kg)   07/13/21 160 lb (72.6 kg)   05/25/21 161 lb (73 kg)     BP Readings from Last 3 Encounters:   01/13/22 124/82   07/13/21 138/70   01/06/21 126/70        GEN: NAD, A&O, Non-toxic  HEENT: NC/AT, NELIA, EOMI, anicteric, oral cavity Clear,  TM's NL  NECK: Supple. No thyromegaly. No JVD  LYMPH: No C/SC nodes.   CV: S1 S2 NL, RRR. No murmurs, clicks or rubs. PULM: CTA  EXT: No edema  GI: Abdomen is soft, NT, BS+, No hepatomegaly. No masses. NEURO: No focal or lateralizing deficits. Moves all extremities. No ataxia  VASC:  No carotid bruits. Pedal pulses symmetric  SKIN:  No rashes or lesions of concern  PSYCH: Mood is bright    ASSESSMENT/PLAN:    1. Graves' disease  Status post ablation  Refill levothyroxine  Check TSH  - levothyroxine (SYNTHROID) 25 MCG tablet; Take 1 tablet by mouth Daily  Dispense: 90 tablet; Refill: 3    2. Posttreatment hypothyroidism (Tapazole)  Condition stability is uncertain  Recheck TSH  Continue to monitor for recurrent hyperthyroidism  - levothyroxine (SYNTHROID) 25 MCG tablet; Take 1 tablet by mouth Daily  Dispense: 90 tablet; Refill: 3    3. Memory problem  Condition is worsening per son's report  Patient needs full neuropsychological assessment  Patient will likely benefit from starting an acetylcholinesterase inhibitor such as Aricept  - Neuropsychological assessment; Future    4. Diverticulosis  Condition is stable  Continue Metamucil    5. Hyperlipidemia  Recheck lab to ensure stability  Continue a healthy diet    6. Hyperglycemia  Condition is asymptomatic  Recheck lab. Her last blood sugar was actually normal  Check A1c    Discussed medications with patient who voiced understanding of their use, indication and potential side effects. Pt also understands the above recommendations. All questions answered. This note was generated completely or in part utilizing Dragon dictation speech recognition software. Occasionally, words are mistranscribed and despite editing, the text may contain inaccuracies due to incorrect word recognition.   If further clarification is needed please contact the office at [phone]       Electronically signed    Doris Erwin D.O.

## 2022-05-16 ENCOUNTER — TELEMEDICINE (OUTPATIENT)
Dept: INTERNAL MEDICINE CLINIC | Age: 72
End: 2022-05-16
Payer: MEDICARE

## 2022-05-16 ENCOUNTER — TELEPHONE (OUTPATIENT)
Dept: INTERNAL MEDICINE CLINIC | Age: 72
End: 2022-05-16

## 2022-05-16 DIAGNOSIS — Z11.59 SCREENING FOR VIRAL DISEASE: ICD-10-CM

## 2022-05-16 DIAGNOSIS — J01.90 ACUTE NON-RECURRENT SINUSITIS, UNSPECIFIED LOCATION: Primary | ICD-10-CM

## 2022-05-16 DIAGNOSIS — R05.9 COUGH: ICD-10-CM

## 2022-05-16 PROCEDURE — 99213 OFFICE O/P EST LOW 20 MIN: CPT | Performed by: INTERNAL MEDICINE

## 2022-05-16 PROCEDURE — 3017F COLORECTAL CA SCREEN DOC REV: CPT | Performed by: INTERNAL MEDICINE

## 2022-05-16 PROCEDURE — 1090F PRES/ABSN URINE INCON ASSESS: CPT | Performed by: INTERNAL MEDICINE

## 2022-05-16 PROCEDURE — 1123F ACP DISCUSS/DSCN MKR DOCD: CPT | Performed by: INTERNAL MEDICINE

## 2022-05-16 PROCEDURE — G8427 DOCREV CUR MEDS BY ELIG CLIN: HCPCS | Performed by: INTERNAL MEDICINE

## 2022-05-16 PROCEDURE — G8399 PT W/DXA RESULTS DOCUMENT: HCPCS | Performed by: INTERNAL MEDICINE

## 2022-05-16 PROCEDURE — 4040F PNEUMOC VAC/ADMIN/RCVD: CPT | Performed by: INTERNAL MEDICINE

## 2022-05-16 RX ORDER — DOXYCYCLINE HYCLATE 100 MG
100 TABLET ORAL 2 TIMES DAILY
Qty: 14 TABLET | Refills: 0 | Status: SHIPPED | OUTPATIENT
Start: 2022-05-16 | End: 2022-05-23

## 2022-05-16 RX ORDER — GUAIFENESIN 600 MG/1
1200 TABLET, EXTENDED RELEASE ORAL 2 TIMES DAILY
Qty: 40 TABLET | Refills: 0 | Status: SHIPPED | OUTPATIENT
Start: 2022-05-16 | End: 2022-05-26

## 2022-05-16 RX ORDER — FLUTICASONE PROPIONATE 50 MCG
2 SPRAY, SUSPENSION (ML) NASAL DAILY
Qty: 16 G | Refills: 5 | Status: SHIPPED | OUTPATIENT
Start: 2022-05-16

## 2022-05-16 NOTE — TELEPHONE ENCOUNTER
Please call patient and offer COVID Flu testing here at office and schedule for VV same day  Thank you

## 2022-05-16 NOTE — TELEPHONE ENCOUNTER
----- Message from Nakul Osborn sent at 5/16/2022 12:31 PM EDT -----  Subject: Message to Provider    QUESTIONS  Information for Provider? Patient is been feeling run down but only   congestions and cough hasnt been around anyone with covid but wants to get   medication called in for the congestion.   ---------------------------------------------------------------------------  --------------  CALL BACK INFO  What is the best way for the office to contact you? OK to leave message on   voicemail  Preferred Call Back Phone Number? [phone]  ---------------------------------------------------------------------------  --------------  SCRIPT ANSWERS  Relationship to Patient?  Self

## 2022-05-16 NOTE — PROGRESS NOTES
Whitney Shaffer (:  1950) is a 66 y.o. female,Established patient, here for evaluation of the following chief complaint(s):   Congestion (x1 week, feeling fatigued, chest congestion, nasal congestion, feels like she is wheezing. slighty colored sputum. neck pain )      ASSESSMENT/PLAN:  1. Acute non-recurrent sinusitis, unspecified location  Sinus congestion, sinus headaches, nasal discharge with postnasal drip and cough x 2 weeks. Will treat for sinusitis with doxycycline and mucinex. Will also have her use flonase in case component of seasonal allergy since cannot take antihistamines. COVID testing performed although given duration of illness would not change treatment plan. 2. Cough  -     guaiFENesin (MUCINEX) 600 MG extended release tablet; Take 2 tablets by mouth 2 times daily for 10 days, Disp-40 tablet, R-0Normal  -     fluticasone (FLONASE) 50 MCG/ACT nasal spray; 2 sprays by Each Nostril route daily, Disp-16 g, R-5Normal  -     doxycycline hyclate (VIBRA-TABS) 100 MG tablet; Take 1 tablet by mouth 2 times daily for 7 days, Disp-14 tablet, R-0Normal  3. Screening for viral disease  -     COVID-19      Return if symptoms worsen or fail to improve. SUBJECTIVE/OBJECTIVE:  HPI    Patient of Dr. Roxy Fisher here for acute visit for congestion. Has been feeling poorly for 2 weeks. Feels like she has a cold. Malaise/fatigue, nasal congestion, cough productive of sputum - not colored. Usually does not have significant seasonal allergies. Frequently has a drippy nose. No fevers. Feels like she has to breathe harder than normal but not really short of breath. No wheezing. Cough present at all times. Constant. OTC remedies - none    She is able to do all her normal activities. No sick contacts.      Review of Systems    Current Outpatient Medications on File Prior to Visit   Medication Sig Dispense Refill    levothyroxine (SYNTHROID) 25 MCG tablet Take 1 tablet by mouth Daily 90 tablet 3    acetaminophen (TYLENOL) 325 MG tablet Take 2 tablets by mouth every 6 hours as needed for Pain 60 tablet 0    aspirin 81 MG EC tablet Take 81 mg by mouth daily      VITAMIN E PO Take by mouth      Ascorbic Acid (VITAMIN C PO) Take by mouth      VITAMIN D PO Take by mouth      Multiple Vitamin (MULTI-VITAMIN DAILY PO) Take by mouth       No current facility-administered medications on file prior to visit. No flowsheet data found. General - well developed, well nourished, NAD  Mental status - Awake and alert, Oriented x 3. Follows commands. Eyes - EOMI, Sclera normal, No conjunctival discharge or injection  HENT: NCAT, MMM. Normal external ears  Neck - no visualized masses, nl ROM  Pulmonary/Chest - Speaking in full sentences, effort normal, no distress. MSK - Normal gait with no signs of ataxia. Neuro - No facial asymmetry, no gaze palzy  Skin - no rashes or discoloration of facial skin  Psych - nl appearance and grooming, affect appropriate to mood, no hallucinations  Other pertinent observable physical exam findings-                   [facility], was evaluated through a synchronous (real-time) audio-video encounter. The patient (or guardian if applicable) is aware that this is a billable service, which includes applicable co-pays. This Virtual Visit was conducted with patient's (and/or legal guardian's) consent. The visit was conducted pursuant to the emergency declaration under the [facility], [address] [place] [address] [facility] authority and the [employer] Act. Patient identification was verified, and a caregiver was present when appropriate. The patient was located in a state where the provider was licensed to provide care. Patient identification was verified at the start of the visit: Yes    Services were provided through a video synchronous discussion virtually to substitute for in-person clinic visit.  Patient was located at home and provider was located in office or at home. An electronic signature was used to authenticate this note.     --Jose Pak MD

## 2022-05-17 ENCOUNTER — TELEPHONE (OUTPATIENT)
Dept: INTERNAL MEDICINE CLINIC | Age: 72
End: 2022-05-17

## 2022-05-17 LAB — SARS-COV-2: NOT DETECTED

## 2022-05-17 NOTE — TELEPHONE ENCOUNTER
Did not know the results were back. Please print out and placed in my box.   I will review and then call him tomorrow

## 2022-05-17 NOTE — TELEPHONE ENCOUNTER
Spoke with patient, to let her know you will call her later on.  She would like you to call her son Valentine Gallagher at [phone]

## 2022-05-17 NOTE — TELEPHONE ENCOUNTER
I had wanted him to come into the office to review the report.   He can tell them I will call them later

## 2022-05-17 NOTE — TELEPHONE ENCOUNTER
Called patient to advise her of the negative covid test.       She then stated she never got a call about her Cognitive screening/test she had completed. I see the results were scanned in to the chart but do not see any documentation on it or notes in the chart. .     Please advise  Thank you

## 2022-05-17 NOTE — TELEPHONE ENCOUNTER
Debra Castillo is requesting to speak with Dr. Roxy Fisher to see if any medication was prescribed for her memory since testing. Please contact him @ phone # provided.

## 2022-05-19 NOTE — TELEPHONE ENCOUNTER
Gerard called back to talk with demetra Huggins about the results. Please call him back at the number provided.

## 2022-05-20 DIAGNOSIS — F02.80 LATE ONSET ALZHEIMER'S DEMENTIA WITHOUT BEHAVIORAL DISTURBANCE (HCC): Primary | ICD-10-CM

## 2022-05-20 DIAGNOSIS — G30.1 LATE ONSET ALZHEIMER'S DEMENTIA WITHOUT BEHAVIORAL DISTURBANCE (HCC): Primary | ICD-10-CM

## 2022-05-20 RX ORDER — DONEPEZIL HYDROCHLORIDE 5 MG/1
5 TABLET, FILM COATED ORAL NIGHTLY
Qty: 30 TABLET | Refills: 0 | Status: SHIPPED | OUTPATIENT
Start: 2022-05-20 | End: 2022-06-16

## 2022-05-20 NOTE — TELEPHONE ENCOUNTER
Discussed with son. We will start her on Aricept 5 mg nightly. We reviewed the medication side effects. Will have her back in 1 month.   Please reach out and schedule her for 1 month

## 2022-06-16 DIAGNOSIS — F02.80 LATE ONSET ALZHEIMER'S DEMENTIA WITHOUT BEHAVIORAL DISTURBANCE (HCC): ICD-10-CM

## 2022-06-16 DIAGNOSIS — G30.1 LATE ONSET ALZHEIMER'S DEMENTIA WITHOUT BEHAVIORAL DISTURBANCE (HCC): ICD-10-CM

## 2022-06-18 RX ORDER — DONEPEZIL HYDROCHLORIDE 5 MG/1
TABLET, FILM COATED ORAL
Qty: 90 TABLET | Refills: 1 | Status: SHIPPED | OUTPATIENT
Start: 2022-06-18

## 2022-07-12 ENCOUNTER — OFFICE VISIT (OUTPATIENT)
Dept: INTERNAL MEDICINE CLINIC | Age: 72
End: 2022-07-12
Payer: MEDICARE

## 2022-07-12 VITALS
DIASTOLIC BLOOD PRESSURE: 76 MMHG | BODY MASS INDEX: 24.14 KG/M2 | WEIGHT: 163 LBS | HEART RATE: 77 BPM | OXYGEN SATURATION: 96 % | HEIGHT: 69 IN | RESPIRATION RATE: 12 BRPM | SYSTOLIC BLOOD PRESSURE: 132 MMHG

## 2022-07-12 DIAGNOSIS — E05.00 GRAVES' DISEASE: ICD-10-CM

## 2022-07-12 DIAGNOSIS — Z12.31 ENCOUNTER FOR SCREENING MAMMOGRAM FOR MALIGNANT NEOPLASM OF BREAST: ICD-10-CM

## 2022-07-12 DIAGNOSIS — G57.01 PIRIFORMIS SYNDROME, RIGHT: ICD-10-CM

## 2022-07-12 DIAGNOSIS — E78.5 HYPERLIPIDEMIA, UNSPECIFIED HYPERLIPIDEMIA TYPE: ICD-10-CM

## 2022-07-12 DIAGNOSIS — M79.18 CHRONIC GLUTEAL PAIN: ICD-10-CM

## 2022-07-12 DIAGNOSIS — E03.9 HYPOTHYROIDISM, UNSPECIFIED TYPE: ICD-10-CM

## 2022-07-12 DIAGNOSIS — G31.84 MILD NEUROCOGNITIVE DISORDER: ICD-10-CM

## 2022-07-12 DIAGNOSIS — G89.29 CHRONIC GLUTEAL PAIN: ICD-10-CM

## 2022-07-12 DIAGNOSIS — Z00.00 INITIAL MEDICARE ANNUAL WELLNESS VISIT: Primary | ICD-10-CM

## 2022-07-12 DIAGNOSIS — R07.89 NON-CARDIAC CHEST PAIN: ICD-10-CM

## 2022-07-12 DIAGNOSIS — F02.80 LATE ONSET ALZHEIMER'S DEMENTIA WITHOUT BEHAVIORAL DISTURBANCE (HCC): ICD-10-CM

## 2022-07-12 DIAGNOSIS — F43.81 GRIEF REACTION WITH PROLONGED BEREAVEMENT: ICD-10-CM

## 2022-07-12 DIAGNOSIS — R73.9 HYPERGLYCEMIA: ICD-10-CM

## 2022-07-12 DIAGNOSIS — Z23 NEED FOR PROPHYLACTIC VACCINATION AND INOCULATION AGAINST VARICELLA: ICD-10-CM

## 2022-07-12 DIAGNOSIS — G30.1 LATE ONSET ALZHEIMER'S DEMENTIA WITHOUT BEHAVIORAL DISTURBANCE (HCC): ICD-10-CM

## 2022-07-12 PROBLEM — F43.29 GRIEF REACTION WITH PROLONGED BEREAVEMENT: Status: ACTIVE | Noted: 2022-07-12

## 2022-07-12 PROCEDURE — 1123F ACP DISCUSS/DSCN MKR DOCD: CPT | Performed by: INTERNAL MEDICINE

## 2022-07-12 PROCEDURE — 1090F PRES/ABSN URINE INCON ASSESS: CPT | Performed by: INTERNAL MEDICINE

## 2022-07-12 PROCEDURE — 3017F COLORECTAL CA SCREEN DOC REV: CPT | Performed by: INTERNAL MEDICINE

## 2022-07-12 PROCEDURE — 99214 OFFICE O/P EST MOD 30 MIN: CPT | Performed by: INTERNAL MEDICINE

## 2022-07-12 PROCEDURE — G0438 PPPS, INITIAL VISIT: HCPCS | Performed by: INTERNAL MEDICINE

## 2022-07-12 PROCEDURE — G8399 PT W/DXA RESULTS DOCUMENT: HCPCS | Performed by: INTERNAL MEDICINE

## 2022-07-12 PROCEDURE — G8427 DOCREV CUR MEDS BY ELIG CLIN: HCPCS | Performed by: INTERNAL MEDICINE

## 2022-07-12 PROCEDURE — 1036F TOBACCO NON-USER: CPT | Performed by: INTERNAL MEDICINE

## 2022-07-12 PROCEDURE — G8420 CALC BMI NORM PARAMETERS: HCPCS | Performed by: INTERNAL MEDICINE

## 2022-07-12 RX ORDER — MELOXICAM 7.5 MG/1
7.5 TABLET ORAL
Qty: 30 TABLET | Refills: 0 | Status: SHIPPED | OUTPATIENT
Start: 2022-07-12

## 2022-07-12 ASSESSMENT — PATIENT HEALTH QUESTIONNAIRE - PHQ9
SUM OF ALL RESPONSES TO PHQ QUESTIONS 1-9: 1
1. LITTLE INTEREST OR PLEASURE IN DOING THINGS: 0
SUM OF ALL RESPONSES TO PHQ QUESTIONS 1-9: 0
SUM OF ALL RESPONSES TO PHQ QUESTIONS 1-9: 0
SUM OF ALL RESPONSES TO PHQ9 QUESTIONS 1 & 2: 0
SUM OF ALL RESPONSES TO PHQ9 QUESTIONS 1 & 2: 1
SUM OF ALL RESPONSES TO PHQ QUESTIONS 1-9: 0
9. THOUGHTS THAT YOU WOULD BE BETTER OFF DEAD, OR OF HURTING YOURSELF: 0
8. MOVING OR SPEAKING SO SLOWLY THAT OTHER PEOPLE COULD HAVE NOTICED. OR THE OPPOSITE, BEING SO FIGETY OR RESTLESS THAT YOU HAVE BEEN MOVING AROUND A LOT MORE THAN USUAL: 0
2. FEELING DOWN, DEPRESSED OR HOPELESS: 0
SUM OF ALL RESPONSES TO PHQ QUESTIONS 1-9: 1
SUM OF ALL RESPONSES TO PHQ QUESTIONS 1-9: 0
2. FEELING DOWN, DEPRESSED OR HOPELESS: 1
SUM OF ALL RESPONSES TO PHQ QUESTIONS 1-9: 1
5. POOR APPETITE OR OVEREATING: 0
3. TROUBLE FALLING OR STAYING ASLEEP: 0
SUM OF ALL RESPONSES TO PHQ QUESTIONS 1-9: 1
7. TROUBLE CONCENTRATING ON THINGS, SUCH AS READING THE NEWSPAPER OR WATCHING TELEVISION: 0
1. LITTLE INTEREST OR PLEASURE IN DOING THINGS: 0
6. FEELING BAD ABOUT YOURSELF - OR THAT YOU ARE A FAILURE OR HAVE LET YOURSELF OR YOUR FAMILY DOWN: 0
4. FEELING TIRED OR HAVING LITTLE ENERGY: 0

## 2022-07-12 ASSESSMENT — ANXIETY QUESTIONNAIRES
4. TROUBLE RELAXING: 1
6. BECOMING EASILY ANNOYED OR IRRITABLE: 1
IF YOU CHECKED OFF ANY PROBLEMS ON THIS QUESTIONNAIRE, HOW DIFFICULT HAVE THESE PROBLEMS MADE IT FOR YOU TO DO YOUR WORK, TAKE CARE OF THINGS AT HOME, OR GET ALONG WITH OTHER PEOPLE: NOT DIFFICULT AT ALL
GAD7 TOTAL SCORE: 5
7. FEELING AFRAID AS IF SOMETHING AWFUL MIGHT HAPPEN: 0
3. WORRYING TOO MUCH ABOUT DIFFERENT THINGS: 1
5. BEING SO RESTLESS THAT IT IS HARD TO SIT STILL: 0
2. NOT BEING ABLE TO STOP OR CONTROL WORRYING: 1
1. FEELING NERVOUS, ANXIOUS, OR ON EDGE: 1

## 2022-07-12 ASSESSMENT — LIFESTYLE VARIABLES: HOW OFTEN DO YOU HAVE A DRINK CONTAINING ALCOHOL: NEVER

## 2022-07-12 NOTE — PATIENT INSTRUCTIONS
**Increase Donepazil to 10 mg nightly**    **Let Dr. Flower Corbin know if pain comes when you are walking or exerting yourself**    **We may want to consider a medicine to help anxiety and your mood, but we will wait to see how you do with the 10 mg of donepezil**    **You may need to see an orthopedist for the buttock pain**                  Personalized Preventive Plan for Mirian Elliott - 7/12/2022  Medicare offers a range of preventive health benefits. Some of the tests and screenings are paid in full while other may be subject to a deductible, co-insurance, and/or copay. Some of these benefits include a comprehensive review of your medical history including lifestyle, illnesses that may run in your family, and various assessments and screenings as appropriate. After reviewing your medical record and screening and assessments performed today your provider may have ordered immunizations, labs, imaging, and/or referrals for you. A list of these orders (if applicable) as well as your Preventive Care list are included within your After Visit Summary for your review. Other Preventive Recommendations:    · A preventive eye exam performed by an eye specialist is recommended every 1-2 years to screen for glaucoma; cataracts, macular degeneration, and other eye disorders. · A preventive dental visit is recommended every 6 months. · Try to get at least 150 minutes of exercise per week or 10,000 steps per day on a pedometer . · Order or download the FREE \"Exercise & Physical Activity: Your Everyday Guide\" from [employer]. Call 3-316.140.6906 or search [employer] online. · You need 5266-9285 mg of calcium and 7508-6532 IU of vitamin D per day.  It is possible to meet your calcium requirement with diet alone, but a vitamin D supplement is usually necessary to meet this goal.  · When exposed to the sun, use a sunscreen that protects against both UVA and UVB radiation with an SPF of 30 or greater. Reapply every 2 to 3 hours or after sweating, drying off with a towel, or swimming. · Always wear a seat belt when traveling in a car. Always wear a helmet when riding a bicycle or motorcycle. Patient Education        Piriformis Syndrome: Care Instructions  Overview     The piriformis muscle is deep under your rear end (buttock). One end of the muscle connects deep inside the pelvic area, and the other end attaches to the top of the thighbone. This muscle can press on the sciatic nerve that runs from your spine down your leg. When this happens, you may have pain, numbness, and tingling in the buttock and down the back of your leg. This is called piriformis syndrome. The pain may get worse when you sit for a long time or climb stairs. Also, you may be more likely to develop piriformis syndrome ifyou run or walk often. Your doctor will check for other causes of your pain before treating this syndrome. Treatment may include stretching exercises, massage, and medicine for the pain and swelling. If these do not help, you may get a shot of steroid medicine. Until the pain is gone, you may need to rest the muscle and limit activities like running. Exercises and a change in how you move and sit may beenough to stop the pressure on the nerve. Follow-up care is a key part of your treatment and safety. Be sure to make and go to all appointments, and call your doctor if you are having problems. It's also a good idea to know your test results and keep alist of the medicines you take. How can you care for yourself at home? If your doctor thinks that strenuous exercise is causing your problem, stop or cut back on activities such as running. You may find swimming to be a good exercise for a while. Stretch the piriformis muscle. Lie on your back. Bend one leg at the knee and keep the other leg flat on the ground. Raise your bent knee up and then move it across your body.  Hold the outside of healthcare professional. [place] [address] any warranty or liability for your use of this information. Patient Education        Piriformis Syndrome: Exercises  Introduction  Here are some examples of exercises for you to try. The exercises may be suggested for a condition or for rehabilitation. Start each exercise slowly. Ease off the exercises if you start to have pain. You will be told when to start these exercises and which ones will work bestfor you. How to do the exercises  Hip rotator stretch    Lie on your back with both knees bent and your feet flat on the floor. Put the ankle of your affected leg on your opposite thigh near your knee. Use your hand to gently push your knee (on your affected leg) away from your body until you feel a gentle stretch around your hip. Hold the stretch for 15 to 30 seconds. Repeat 2 to 4 times. Switch legs and repeat steps 1 through 5. Piriformis stretch    Lie on your back with your legs straight. Lift your affected leg and bend your knee. With your opposite hand, reach across your body, and then gently pull your knee toward your opposite shoulder. Hold the stretch for 15 to 30 seconds. Repeat with your other leg. Repeat 2 to 4 times on each side. Lower abdominal strengthening    Lie on your back with your knees bent and your feet flat on the floor. Tighten your belly muscles by pulling your belly button in toward your spine. Lift one foot off the floor and bring your knee toward your chest, so that your knee is straight above your hip and your leg is bent like the letter \"L. \"  Lift the other knee up to the same position. Lower one leg at a time to the starting position. Keep alternating legs until you have lifted each leg 8 to 12 times. Be sure to keep your belly muscles tight and your back still as you are moving your legs. Be sure to breathe normally. Follow-up care is a key part of your treatment and safety.  Be sure to make and go to all appointments, and call your doctor if you are having problems. It's also a good idea to know your test results and keep alist of the medicines you take. Current as of: March 9, 2022               Content Version: 13.3  © 2006-2022 [employer]. Care instructions adapted under license by [facility] ([facility]). If you have questions about a medical condition or this instruction, always ask your healthcare professional. [address] any warranty or liability for your use of this information.

## 2022-07-12 NOTE — PROGRESS NOTES
[facility]) Physicians  Internal Medicine  Patient Encounter  Alison Rader D.O., [facility] Annual Wellness Visit      Winston Ramirez  7/12/2022    Subjective Winston Ramirez is here for Medicare AWV      Also, she has additional complaints of memory problems. She states she has issues with short term, but not long term. She had a full neuropsychological assessment. We addressed this on 1/13/2022. We reviewed this report. Patient continues to report memory problems. Her insight is fair regarding the evaluation that she had. She does state that she continues to get some pain just beneath the left breast particularly when she is anxious. She denies any exertional chest pain or tightness. She denies any shortness of breath or any other exertional intolerance. She states she only gets the discomfort when she is anxious. She is tolerating the donepezil. She denies any diarrhea or lightheadedness. She has had no constipation. She denies any vivid dreams. She does admit she continues to grieve the loss of her . At the end of the visit she brought up another complaint. She is complaining of pain in the right buttock. She states the pain started 2-3 years ago, but then states 6 years ago. She is not very clear on the onset. She states she is told many doctors about this but nothing has been done. She states the pain is worse with prolonged sitting. She has not seen an ortho specialist.  Walking makes the pain ease. She denies radiating pain. She has not tried OTC medication. She denies N/T.         Medical/Surgical Histories     Past Medical History:   Diagnosis Date    Bilateral carpal tunnel syndrome     Diverticula of colon     Diverticulitis 08/2019    Diverticulitis 12/27/2020    Diverticulitis 10/06/2016    Diverticulitis large intestine w/o perforation or abscess w/o bleeding 06/23/2017   Franklin Babinski' disease     Grief reaction with prolonged bereavement 7/12/2022    History of shingles 07/13/2021    Formatting of this note might be different from the original. left side forehead and scalp, had eye exam    Hyperthyroidism     Late onset Alzheimer's dementia without behavioral disturbance ([facility] [address] 7/12/2022    Migraine 09/24/2014    Mild neurocognitive disorder 7/12/2022    Rotator cuff tear, right 12/05/2014          Past Surgical History:   Procedure Laterality Date    APPENDECTOMY      CATARACT REMOVAL WITH IMPLANT Bilateral     FOOT SURGERY Bilateral     HYSTERECTOMY (CERVIX STATUS UNKNOWN)  2009    ROTATOR CUFF REPAIR Right            Medications/Allergies     Medication Sig    donepezil (ARICEPT) 5 MG tablet TAKE ONE TABLET BY MOUTH ONCE NIGHTLY    fluticasone (FLONASE) 50 MCG/ACT nasal spray 2 sprays by Each Nostril route daily    levothyroxine (SYNTHROID) 25 MCG tablet Take 1 tablet by mouth Daily    acetaminophen (TYLENOL) 325 MG tablet Take 2 tablets by mouth every 6 hours as needed for Pain    aspirin 81 MG EC tablet Take 81 mg by mouth daily    VITAMIN E PO Take by mouth    Ascorbic Acid (VITAMIN C PO) Take by mouth    VITAMIN D PO Take by mouth    Multiple Vitamin (MULTI-VITAMIN DAILY PO) Take by mouth         Allergies   Allergen Reactions    Antihistamines, Loratadine-Type          Substance Use History     Social History     Tobacco Use    Smoking status: Never Smoker    Smokeless tobacco: Never Used   Vaping Use    Vaping Use: Never used   Substance Use Topics    Alcohol use: No    Drug use: Never          Family History     Family History   Problem Relation Age of Onset    Heart Attack Brother     Arthritis Sister     Heart Attack Sister 80    Lung Cancer Brother     Mult Sclerosis Son 25            CARE TEAM  (Including outside providers/suppliers regularly involved in providing care):   Patient Care Team:  Lizz Roth DO as PCP - General (Internal Medicine)  Lizz Roth DO as PCP - Theresa Cash Provider  Anaid Cee MD as Consulting Physician (Gastroenterology)  Kristine Odonnell      Patient's complete Health Risk Assessment and screening values have been reviewed and are found in [address]. The following risks were reviewed today and where indicated follow up appointments were made and/or referrals ordered. Positive Risk Factor Screenings with Interventions:              Health Habits/Nutrition:     Physical Activity: Sufficiently Active    Days of Exercise per Week: 7 days    Minutes of Exercise per Session: 30 min     Have you lost any weight without trying in the past 3 months?: No  Body mass index: 24.07  Have you seen the dentist within the past year?: (!) No    Health Habits/Nutrition Interventions:  · Dental exam overdue:  patient encouraged to make appointment with his/her dentist    Hearing/Vision:  Do you or your family notice any trouble with your hearing that hasn't been managed with hearing aids?: No  Do you have difficulty driving, watching TV, or doing any of your daily activities because of your eyesight?: No  Have you had an eye exam within the past year?: (!) No  No exam data present    Hearing/Vision Interventions:  · Vision concerns:  patient encouraged to make appointment with his/her eye specialist          ROS:  MS:  Upper abd pain and chest pain. Pain is beneath the left breast.  This occurs when she is nervous. No exertional symptoms. PSYCH: Anxiety and depression. Latter seen during neuropaych eval.  She gets depressed when thinking about her . She lost her  right before quartantine. She could not have her family around her. She is still grieving his loss.     MS: Right buttock pain      PHQ Scores 7/12/2022 7/12/2022 7/13/2021 11/17/2020   PHQ2 Score 1 0 0 0   PHQ9 Score 1 0 0 0     Interpretation of Total Score Depression Severity: 1-4 = Minimal depression, 5-9 = Mild depression, 10-14 = Moderate depression, 15-19 = Moderately severe depression, 20-27 = Severe depression      INO 7 SCORE 7/12/2022   INO-7 Total Score 5     Interpretation of INO-7 score: 5-9 = mild anxiety, 10-14 = moderate anxiety, 15+ = severe anxiety. Recommend referral to behavioral health for scores 10 or greater. OBJECTIVE     Vitals:    07/12/22 1000   BP: 132/76   Pulse: 77   Resp: 12   SpO2: 96%   Weight: 163 lb (73.9 kg)   Height: 5' 9\" (1.753 m)     Body mass index is 24.07 kg/m². Wt Readings from Last 3 Encounters:   07/12/22 163 lb (73.9 kg)   01/13/22 156 lb (70.8 kg)   07/13/21 160 lb (72.6 kg)     BP Readings from Last 3 Encounters:   07/12/22 132/76   01/13/22 124/82   07/13/21 138/70          GEN:  66 y.o. female who is in NAD, A&O. She appears stated age and well nourished. She is cooperative and pleasant. HEAD:  NC/AT, no lesions. EYES:  DAVID, EOMI, No scleral icterus or conjunctival injection or discharge. Visual fields in tact to confrontation. EARS:  EAC's clear, TM's normal.  MOUTH & THROAT:  Oral cavity is clear without mucosal lesions. Tongue is midline. Dentition is in good repair. No pharyngeal erythema or exudate. NECK:  Supple. Full ROM. Trachea is midline. No increased JVD. No thyromegaly or nodules. No masses  LYMPH: No C/SC/A/F nodes  CARDIAC: Regular rhythm. Rate normal.  No murmurs. No ectopy  VASC:  Pedal pulses 2/4. Carotid upstrokes 2+. No bruits noted. PULM:  Lungs are CTA. Symmetric breath sounds noted. AP Diameter NL. GI:  Abdomen is soft and nontender. No distension. No organomegaly. No masses. No pulsatile masses. EXT:  No Cyanosis or clubbing. No edema. SKIN: Warm and dry, normal turgor, no rash or lesions of concern. NEURO: No focal deficits. MS: + TTP with palpation of the right piriformis with radicular pain. Pain reproduced also with resistance to knee flexion. PSYCH:  Mood and affect NL. Judgement and insight NL.                  Reviewed and updated this visit:  Tobacco Allergies  Meds  Med Hx  Surg Hx  Soc Hx  Fam Hx        ASSESSMENT/PLAN     ASSESSMENT/PLAN:    1. Initial Medicare annual wellness visit  All care gaps identified and addressed  Counseled on the importance of updating shingles vaccine  Due for mammogram  - zoster recombinant adjuvanted vaccine [facility]) 50 MCG/0.5ML SUSR injection; Inject 0.5 mLs into the muscle once for 1 dose  Dispense: 0.5 mL; Refill: 0  - CBC with Auto Differential; Future  - Comprehensive Metabolic Panel; Future  - Lipid Panel; Future  - Hemoglobin A1C; Future  - TSH; Future    2. Need for prophylactic vaccination and inoculation against varicella    - zoster recombinant adjuvanted vaccine [facility]) 50 MCG/0.5ML SUSR injection; Inject 0.5 mLs into the muscle once for 1 dose  Dispense: 0.5 mL; Refill: 0    3. Mild neurocognitive disorder  4. Late onset Alzheimer's dementia without behavioral disturbance (HCC)  Status post neuropsychological assessment  Increase donepezil to 10 mg  Monitor for adverse side effects  Follow-up in about 6 weeks to reassess mood    5. Grief reaction with prolonged bereavement  Consider an SSRI  Consider getting back into counseling    6. Encounter for screening mammogram for malignant neoplasm of breast    - CLIVE DIGITAL SCREEN W OR WO CAD BILATERAL; Future    7. Hyperlipidemia, unspecified hyperlipidemia type    - Comprehensive Metabolic Panel; Future  - Lipid Panel; Future    8. Hyperglycemia    - Comprehensive Metabolic Panel; Future  - Hemoglobin A1C; Future    9. Graves' disease    - TSH; Future    10. Hypothyroidism, unspecified type    - TSH; Future      11. Noncardiac chest pain  Her chest discomfort that is located beneath the left breast is possibly due to her anxiety  If symptoms continue or symptoms become relative to exercise or exertion, will consider stress test    12. Right gluteal pain, chronic  ? Piriformis syndrome,?   SI joint pain  Sickle therapy  X-ray pelvis and hips    On this date 7/12/2022 I have spent 50 minutes reviewing previous notes, test results and face to face with the patient discussing the diagnosis and importance of compliance with the treatment plan as well as documenting on the day of the visit. Recommendations for Preventive Services Due: see orders and patient instructions/AVS.  Recommended screening schedule for the next 5-10 years is provided to the patient in written form: see Patient Instructions/AVS.    Return in 6 weeks (on 8/23/2022) for check up for chronic medical problems, Medication Check.

## 2022-09-02 ENCOUNTER — HOSPITAL ENCOUNTER (OUTPATIENT)
Dept: MAMMOGRAPHY | Age: 72
Discharge: HOME | End: 2022-09-02
Payer: MEDICARE

## 2022-09-02 VITALS — HEIGHT: 69 IN | BODY MASS INDEX: 23.7 KG/M2 | WEIGHT: 160 LBS

## 2022-09-02 DIAGNOSIS — Z12.31 ENCOUNTER FOR SCREENING MAMMOGRAM FOR MALIGNANT NEOPLASM OF BREAST: ICD-10-CM

## 2022-09-02 PROCEDURE — 77063 BREAST TOMOSYNTHESIS BI: CPT

## 2023-01-09 DIAGNOSIS — G30.1 LATE ONSET ALZHEIMER'S DEMENTIA WITHOUT BEHAVIORAL DISTURBANCE (HCC): ICD-10-CM

## 2023-01-09 DIAGNOSIS — F02.80 LATE ONSET ALZHEIMER'S DEMENTIA WITHOUT BEHAVIORAL DISTURBANCE (HCC): ICD-10-CM

## 2023-01-09 DIAGNOSIS — E05.00 GRAVES' DISEASE: ICD-10-CM

## 2023-01-09 RX ORDER — DONEPEZIL HYDROCHLORIDE 5 MG/1
TABLET, FILM COATED ORAL
Qty: 90 TABLET | Refills: 0 | Status: SHIPPED | OUTPATIENT
Start: 2023-01-09

## 2023-01-09 RX ORDER — LEVOTHYROXINE SODIUM 0.03 MG/1
25 TABLET ORAL DAILY
Qty: 90 TABLET | Refills: 0 | Status: SHIPPED | OUTPATIENT
Start: 2023-01-09

## 2023-01-09 NOTE — TELEPHONE ENCOUNTER
Patient out of town and will run out of medication. Request rx be sent to [facility] in [place].  Medication pended to appropriate pharmacy

## 2023-04-17 DIAGNOSIS — F02.80 LATE ONSET ALZHEIMER'S DEMENTIA WITHOUT BEHAVIORAL DISTURBANCE (HCC): ICD-10-CM

## 2023-04-17 DIAGNOSIS — G30.1 LATE ONSET ALZHEIMER'S DEMENTIA WITHOUT BEHAVIORAL DISTURBANCE (HCC): ICD-10-CM

## 2023-04-17 RX ORDER — DONEPEZIL HYDROCHLORIDE 5 MG/1
TABLET, FILM COATED ORAL
Qty: 90 TABLET | Refills: 0 | Status: SHIPPED | OUTPATIENT
Start: 2023-04-17

## 2023-04-17 NOTE — TELEPHONE ENCOUNTER
----- Message from Carlo Elkins sent at 4/17/2023  9:19 AM EDT -----  Subject: Refill Request    QUESTIONS  Name of Medication? donepezil (ARICEPT) 5 MG tablet  Patient-reported dosage and instructions? 5 MG  once daily  How many days do you have left? 2  Preferred Pharmacy? [facility] [id number]  Pharmacy phone number (if available)? [phone]  Additional Information for Provider? Patient only has 2 days left. She   would like to know if she can have a weeks worth to get her to her appt on   4/25 with Dr. Matteo Grewal?  ---------------------------------------------------------------------------  --------------  [address]  What is the best way for the office to contact you? OK to leave message on   voicemail  Preferred Call Back Phone Number? [phone]  ---------------------------------------------------------------------------  --------------  SCRIPT ANSWERS  Relationship to Patient?  Self

## 2023-04-17 NOTE — TELEPHONE ENCOUNTER
Patient received #90 on 1/9/23. She is requesting a refill as she only has 2 days left.    Please advise   Thank you

## 2023-04-21 DIAGNOSIS — E05.00 GRAVES' DISEASE: ICD-10-CM

## 2023-04-21 RX ORDER — LEVOTHYROXINE SODIUM 0.03 MG/1
TABLET ORAL
Qty: 90 TABLET | Refills: 0 | Status: SHIPPED | OUTPATIENT
Start: 2023-04-21

## 2023-04-21 NOTE — TELEPHONE ENCOUNTER
Refill Request     Last Seen: 7/12/2022    Last Written: 1/9/23    Next Appointment:   Future Appointments   Date Time Provider Irwin Pascal   4/25/2023  1:30 PM DO ARYAN Cruz             Requested Prescriptions     Pending Prescriptions Disp Refills    levothyroxine (SYNTHROID) 25 MCG tablet [Pharmacy Med Name: LEVOTHYROXINE 25 MCG TABLET] 90 tablet 0     Sig: TAKE ONE TABLET BY MOUTH DAILY

## 2023-05-03 ENCOUNTER — OFFICE VISIT (OUTPATIENT)
Dept: INTERNAL MEDICINE CLINIC | Age: 73
End: 2023-05-03

## 2023-05-03 VITALS
TEMPERATURE: 97.3 F | HEART RATE: 78 BPM | DIASTOLIC BLOOD PRESSURE: 74 MMHG | WEIGHT: 162.2 LBS | BODY MASS INDEX: 23.95 KG/M2 | SYSTOLIC BLOOD PRESSURE: 132 MMHG | OXYGEN SATURATION: 98 %

## 2023-05-03 DIAGNOSIS — F02.80 LATE ONSET ALZHEIMER'S DEMENTIA WITHOUT BEHAVIORAL DISTURBANCE (HCC): ICD-10-CM

## 2023-05-03 DIAGNOSIS — G30.1 LATE ONSET ALZHEIMER'S DEMENTIA WITHOUT BEHAVIORAL DISTURBANCE (HCC): ICD-10-CM

## 2023-05-03 DIAGNOSIS — E05.00 GRAVES' DISEASE: ICD-10-CM

## 2023-05-03 DIAGNOSIS — F43.29 GRIEF REACTION WITH PROLONGED BEREAVEMENT: Primary | ICD-10-CM

## 2023-05-03 DIAGNOSIS — E78.5 HYPERLIPIDEMIA, UNSPECIFIED HYPERLIPIDEMIA TYPE: ICD-10-CM

## 2023-05-03 LAB
ALBUMIN SERPL-MCNC: 4.6 G/DL (ref 3.4–5)
ALBUMIN/GLOB SERPL: 1.8 {RATIO} (ref 1.1–2.2)
ALP SERPL-CCNC: 130 U/L (ref 40–129)
ALT SERPL-CCNC: 8 U/L (ref 10–40)
ANION GAP SERPL CALCULATED.3IONS-SCNC: 14 MMOL/L (ref 3–16)
AST SERPL-CCNC: 14 U/L (ref 15–37)
BASOPHILS # BLD: 0.3 K/UL (ref 0–0.2)
BASOPHILS NFR BLD: 5 %
BILIRUB SERPL-MCNC: 0.3 MG/DL (ref 0–1)
BUN SERPL-MCNC: 15 MG/DL (ref 7–20)
CALCIUM SERPL-MCNC: 10.1 MG/DL (ref 8.3–10.6)
CHLORIDE SERPL-SCNC: 106 MMOL/L (ref 99–110)
CHOLEST SERPL-MCNC: 218 MG/DL (ref 0–199)
CO2 SERPL-SCNC: 22 MMOL/L (ref 21–32)
CREAT SERPL-MCNC: 0.7 MG/DL (ref 0.6–1.2)
DEPRECATED RDW RBC AUTO: 13.8 % (ref 12.4–15.4)
EOSINOPHIL # BLD: 0.1 K/UL (ref 0–0.6)
EOSINOPHIL NFR BLD: 2 %
GFR SERPLBLD CREATININE-BSD FMLA CKD-EPI: >60 ML/MIN/{1.73_M2}
GLUCOSE SERPL-MCNC: 90 MG/DL (ref 70–99)
HCT VFR BLD AUTO: 44.1 % (ref 36–48)
HDLC SERPL-MCNC: 89 MG/DL (ref 40–60)
HGB BLD-MCNC: 14.9 G/DL (ref 12–16)
LDLC SERPL CALC-MCNC: 112 MG/DL
LYMPHOCYTES # BLD: 0.8 K/UL (ref 1–5.1)
LYMPHOCYTES NFR BLD: 11 %
MCH RBC QN AUTO: 31.8 PG (ref 26–34)
MCHC RBC AUTO-ENTMCNC: 33.9 G/DL (ref 31–36)
MCV RBC AUTO: 93.8 FL (ref 80–100)
MICROCYTES BLD QL SMEAR: ABNORMAL
MONOCYTES # BLD: 0.2 K/UL (ref 0–1.3)
MONOCYTES NFR BLD: 4 %
MONONUC CELLS NFR BLD MANUAL: 1 %
NEUTROPHILS # BLD: 3.9 K/UL (ref 1.7–7.7)
NEUTROPHILS NFR BLD: 73 %
OVALOCYTES BLD QL SMEAR: ABNORMAL
PATH INTERP BLD-IMP: YES
PLATELET # BLD AUTO: 312 K/UL (ref 135–450)
PMV BLD AUTO: 8.8 FL (ref 5–10.5)
POTASSIUM SERPL-SCNC: 4.2 MMOL/L (ref 3.5–5.1)
PROT SERPL-MCNC: 7.2 G/DL (ref 6.4–8.2)
RBC # BLD AUTO: 4.7 M/UL (ref 4–5.2)
SLIDE REVIEW: ABNORMAL
SODIUM SERPL-SCNC: 142 MMOL/L (ref 136–145)
TRIGL SERPL-MCNC: 86 MG/DL (ref 0–150)
TSH SERPL DL<=0.005 MIU/L-ACNC: 2.69 UIU/ML (ref 0.27–4.2)
VARIANT LYMPHS NFR BLD MANUAL: 4 % (ref 0–6)
VLDLC SERPL CALC-MCNC: 17 MG/DL
WBC # BLD AUTO: 5.4 K/UL (ref 4–11)

## 2023-05-03 RX ORDER — DONEPEZIL HYDROCHLORIDE 10 MG/1
10 TABLET, FILM COATED ORAL NIGHTLY
Qty: 90 TABLET | Refills: 1 | Status: SHIPPED | OUTPATIENT
Start: 2023-05-03

## 2023-05-03 SDOH — ECONOMIC STABILITY: FOOD INSECURITY: WITHIN THE PAST 12 MONTHS, THE FOOD YOU BOUGHT JUST DIDN'T LAST AND YOU DIDN'T HAVE MONEY TO GET MORE.: NEVER TRUE

## 2023-05-03 SDOH — ECONOMIC STABILITY: FOOD INSECURITY: WITHIN THE PAST 12 MONTHS, YOU WORRIED THAT YOUR FOOD WOULD RUN OUT BEFORE YOU GOT MONEY TO BUY MORE.: NEVER TRUE

## 2023-05-03 SDOH — ECONOMIC STABILITY: INCOME INSECURITY: HOW HARD IS IT FOR YOU TO PAY FOR THE VERY BASICS LIKE FOOD, HOUSING, MEDICAL CARE, AND HEATING?: NOT HARD AT ALL

## 2023-05-03 ASSESSMENT — PATIENT HEALTH QUESTIONNAIRE - PHQ9
SUM OF ALL RESPONSES TO PHQ QUESTIONS 1-9: 1
2. FEELING DOWN, DEPRESSED OR HOPELESS: 1
1. LITTLE INTEREST OR PLEASURE IN DOING THINGS: 0
SUM OF ALL RESPONSES TO PHQ QUESTIONS 1-9: 1
SUM OF ALL RESPONSES TO PHQ9 QUESTIONS 1 & 2: 1
SUM OF ALL RESPONSES TO PHQ QUESTIONS 1-9: 1
SUM OF ALL RESPONSES TO PHQ QUESTIONS 1-9: 1

## 2023-05-03 NOTE — PROGRESS NOTES
recognition.   If further clarification is needed please contact the office at [phone]       Electronically signed    Esther Cyr D.O.

## 2023-05-03 NOTE — PATIENT INSTRUCTIONS
To do list:    #1 increase donepezil to 10 mg nightly. Monitor for adverse side effects such as diarrhea, decreased appetite, weight loss, nightmares.

## 2023-05-04 LAB — PATH INTERP BLD-IMP: NORMAL

## 2023-07-16 DIAGNOSIS — E05.00 GRAVES' DISEASE: ICD-10-CM

## 2023-07-17 RX ORDER — LEVOTHYROXINE SODIUM 0.03 MG/1
TABLET ORAL
Qty: 90 TABLET | Refills: 0 | Status: SHIPPED | OUTPATIENT
Start: 2023-07-17

## 2023-07-17 NOTE — TELEPHONE ENCOUNTER
Last appointment: 5/3/2023  Next appointment: Visit date not found  Last refill: 4/21/2023  Not due back in office until November

## 2023-07-21 DIAGNOSIS — G30.1 LATE ONSET ALZHEIMER'S DEMENTIA WITHOUT BEHAVIORAL DISTURBANCE (HCC): ICD-10-CM

## 2023-07-21 DIAGNOSIS — F02.80 LATE ONSET ALZHEIMER'S DEMENTIA WITHOUT BEHAVIORAL DISTURBANCE (HCC): ICD-10-CM

## 2023-07-21 RX ORDER — DONEPEZIL HYDROCHLORIDE 10 MG/1
TABLET, FILM COATED ORAL
Qty: 90 TABLET | Refills: 1 | Status: SHIPPED | OUTPATIENT
Start: 2023-07-21

## 2023-09-11 DIAGNOSIS — G30.1 LATE ONSET ALZHEIMER'S DEMENTIA WITHOUT BEHAVIORAL DISTURBANCE (HCC): ICD-10-CM

## 2023-09-11 DIAGNOSIS — E05.00 GRAVES' DISEASE: ICD-10-CM

## 2023-09-11 DIAGNOSIS — F02.80 LATE ONSET ALZHEIMER'S DEMENTIA WITHOUT BEHAVIORAL DISTURBANCE (HCC): ICD-10-CM

## 2023-09-11 RX ORDER — DONEPEZIL HYDROCHLORIDE 5 MG/1
TABLET, FILM COATED ORAL
Qty: 90 TABLET | Refills: 0 | Status: SHIPPED | OUTPATIENT
Start: 2023-09-11

## 2023-09-11 RX ORDER — LEVOTHYROXINE SODIUM 0.03 MG/1
25 TABLET ORAL DAILY
Qty: 90 TABLET | Refills: 0 | Status: SHIPPED | OUTPATIENT
Start: 2023-09-11

## 2023-09-11 NOTE — TELEPHONE ENCOUNTER
Last appointment: 5/3/2023  Next appointment: Visit date not found  Last refill: 7/21/2023 7/17/2023  Sent [employer] message to schedule due/overdue appointment.

## 2023-09-11 NOTE — TELEPHONE ENCOUNTER
[address]     Patient is calling to let Chun Sullivan know that she will be going to a independent and assistant living facility [facility] on [address] in a couple of day's. Patient also stated she placed a medication refill request for her medication since she had lost the last bottle and hasn't been taking it since yesterday.

## 2023-09-20 ENCOUNTER — TELEPHONE (OUTPATIENT)
Dept: INTERNAL MEDICINE CLINIC | Age: 73
End: 2023-09-20

## 2023-09-20 DIAGNOSIS — G30.1 LATE ONSET ALZHEIMER'S DEMENTIA WITHOUT BEHAVIORAL DISTURBANCE (HCC): Primary | ICD-10-CM

## 2023-09-20 DIAGNOSIS — F02.80 LATE ONSET ALZHEIMER'S DEMENTIA WITHOUT BEHAVIORAL DISTURBANCE (HCC): Primary | ICD-10-CM

## 2023-09-20 NOTE — TELEPHONE ENCOUNTER
----- Message from Jaky Cordero sent at 9/20/2023 11:56 AM EDT -----  Subject: Referral Request    Reason for referral request? Patient called and stated she needs to get a   order for Speech therapy. Patient stated she is living at [facility] on   [address] [place] [address],[address] [address] now and is having some memory issues and they told her to   get a order from her provider. Provider patient wants to be referred to(if known):     Provider Phone Number(if known):     Additional Information for Provider?   ---------------------------------------------------------------------------  --------------  [address]    [phone]; OK to leave message on voicemail  ---------------------------------------------------------------------------  --------------

## 2023-09-20 NOTE — TELEPHONE ENCOUNTER
Patient is requesting a referral for Speech therapy. She is now living at [facility] on [facility]. She Is having some difficulty with her memory. She has been asked to get this referral from us. Would you like to see her in office for this?

## 2023-10-05 ENCOUNTER — TELEPHONE (OUTPATIENT)
Dept: INTERNAL MEDICINE CLINIC | Age: 73
End: 2023-10-05

## 2023-10-05 NOTE — TELEPHONE ENCOUNTER
Per Dr. Shawnee Rodriguez verbally he would like the patient to be seen today either her eye doctor or [facility] urgent care. Patient has been advised, she does not have a eye doctor currently. She will call [facility] to get an appointment today.     She will call back if she needs anything

## 2023-10-05 NOTE — TELEPHONE ENCOUNTER
Patient called back and state she had called [facility] for an appointment and wasn't aware of Dr. Karina Weller suggestion, called patient back and LVM for her with 's recommendation.

## 2023-10-05 NOTE — TELEPHONE ENCOUNTER
Patient says she awoke yesterday morning unable to see clearly out of her left eye. She states it's like there is a film over half of her eye. She can see very clearly out of her right eye but the vision in her left is very blurry. Please advise.

## 2023-12-06 DIAGNOSIS — E05.00 GRAVES' DISEASE: ICD-10-CM

## 2023-12-06 RX ORDER — LEVOTHYROXINE SODIUM 0.03 MG/1
25 TABLET ORAL DAILY
Qty: 90 TABLET | Refills: 1 | Status: SHIPPED | OUTPATIENT
Start: 2023-12-06

## 2023-12-06 NOTE — TELEPHONE ENCOUNTER
----- Message from Andrea Cooney sent at 12/6/2023  9:52 AM EST -----  Subject: Refill Request    QUESTIONS  Name of Medication? levothyroxine (SYNTHROID) 25 MCG tablet  Patient-reported dosage and instructions? 25 mcg, once a day in the   morning  How many days do you have left? 0  Preferred Pharmacy? [facility] [id number]  Pharmacy phone number (if available)? [phone]  Additional Information for Provider? Please refill ASAP as the patient is   completely out. Patient asks for a call when done. Thank you.  ---------------------------------------------------------------------------  --------------  CALL BACK INFO  What is the best way for the office to contact you? OK to leave message on   voicemail  Preferred Call Back Phone Number? [phone]  ---------------------------------------------------------------------------  --------------  SCRIPT ANSWERS  Relationship to Patient?  Self

## 2024-02-20 DIAGNOSIS — G30.1 LATE ONSET ALZHEIMER'S DEMENTIA WITHOUT BEHAVIORAL DISTURBANCE (HCC): ICD-10-CM

## 2024-02-20 DIAGNOSIS — F02.80 LATE ONSET ALZHEIMER'S DEMENTIA WITHOUT BEHAVIORAL DISTURBANCE (HCC): ICD-10-CM

## 2024-02-20 NOTE — TELEPHONE ENCOUNTER
Left message on voicemail for return call. 2 different strengths of Donepezil on med list. Is she taking both? If so, the 5 mg looks like it is due for a refill, too.    Last appointment: 5/3/2023  Next appointment: 3/11/2024

## 2024-02-20 NOTE — TELEPHONE ENCOUNTER
----- Message from Elizabeth Fuller sent at 2/20/2024  8:58 AM EST -----  Subject: Refill Request    QUESTIONS  Name of Medication? donepezil (ARICEPT) 10 MG tablet  Patient-reported dosage and instructions? 10mg, 1 po qd  How many days do you have left? 3  Preferred Pharmacy? [facility] [id number]  Pharmacy phone number (if available)? [phone]  ---------------------------------------------------------------------------  --------------  CALL BACK INFO  What is the best way for the office to contact you? OK to leave message on   voicemail  Preferred Call Back Phone Number? [phone]  ---------------------------------------------------------------------------  --------------  SCRIPT ANSWERS  Relationship to Patient? Self

## 2024-02-26 RX ORDER — DONEPEZIL HYDROCHLORIDE 10 MG/1
10 TABLET, FILM COATED ORAL NIGHTLY
Qty: 90 TABLET | Refills: 3 | Status: SHIPPED | OUTPATIENT
Start: 2024-02-26

## 2024-02-26 NOTE — TELEPHONE ENCOUNTER
Patient is calling back in regards to medication, per patient is currently taking the donepezil (ARICEPT) 10 MG tablet and would like a refill sent to pharmacy .     Please send medication to :    [facility] [id number] - [place], [place] - [address] - P [phone] - F [phone] [address], [facility] [address]  Phone: [phone]  Fax: [phone]

## 2024-02-27 DIAGNOSIS — E05.00 GRAVES' DISEASE: ICD-10-CM

## 2024-02-27 DIAGNOSIS — G30.1 LATE ONSET ALZHEIMER'S DEMENTIA WITHOUT BEHAVIORAL DISTURBANCE (HCC): ICD-10-CM

## 2024-02-27 DIAGNOSIS — F02.80 LATE ONSET ALZHEIMER'S DEMENTIA WITHOUT BEHAVIORAL DISTURBANCE (HCC): ICD-10-CM

## 2024-02-27 RX ORDER — DONEPEZIL HYDROCHLORIDE 5 MG/1
TABLET, FILM COATED ORAL
Qty: 90 TABLET | Refills: 0 | OUTPATIENT
Start: 2024-02-27

## 2024-02-27 RX ORDER — LEVOTHYROXINE SODIUM 0.03 MG/1
25 TABLET ORAL DAILY
Qty: 90 TABLET | Refills: 1 | Status: SHIPPED | OUTPATIENT
Start: 2024-02-27

## 2024-02-27 NOTE — TELEPHONE ENCOUNTER
Patient is calling in for  in regards to medication refill:    levothyroxine (SYNTHROID) 25 MCG tablet   Last appointment: 5/3/2023  Next appointment: 3/11/2024  Last refill: 12/06/23      Please send request to :    [facility] [id number] [facility] [address] - P [phone] - F [phone]  [address], [facility] [address]  Phone: [phone]  Fax: [phone]

## 2024-03-11 ENCOUNTER — OFFICE VISIT (OUTPATIENT)
Dept: INTERNAL MEDICINE CLINIC | Age: 74
End: 2024-03-11
Payer: MEDICARE

## 2024-03-11 VITALS
WEIGHT: 165 LBS | HEART RATE: 88 BPM | HEIGHT: 68 IN | SYSTOLIC BLOOD PRESSURE: 130 MMHG | BODY MASS INDEX: 25.01 KG/M2 | OXYGEN SATURATION: 99 % | DIASTOLIC BLOOD PRESSURE: 66 MMHG | TEMPERATURE: 97.8 F

## 2024-03-11 DIAGNOSIS — Z00.00 MEDICARE ANNUAL WELLNESS VISIT, SUBSEQUENT: ICD-10-CM

## 2024-03-11 DIAGNOSIS — E78.5 HYPERLIPIDEMIA, UNSPECIFIED HYPERLIPIDEMIA TYPE: ICD-10-CM

## 2024-03-11 DIAGNOSIS — E03.9 HYPOTHYROIDISM, UNSPECIFIED TYPE: ICD-10-CM

## 2024-03-11 DIAGNOSIS — F32.A MILD DEPRESSION: ICD-10-CM

## 2024-03-11 DIAGNOSIS — G30.1 LATE ONSET ALZHEIMER'S DEMENTIA WITHOUT BEHAVIORAL DISTURBANCE (HCC): ICD-10-CM

## 2024-03-11 DIAGNOSIS — R73.9 HYPERGLYCEMIA: ICD-10-CM

## 2024-03-11 DIAGNOSIS — Z13.1 SCREENING FOR DIABETES MELLITUS: ICD-10-CM

## 2024-03-11 DIAGNOSIS — Z00.00 MEDICARE ANNUAL WELLNESS VISIT, SUBSEQUENT: Primary | ICD-10-CM

## 2024-03-11 DIAGNOSIS — Z12.31 ENCOUNTER FOR SCREENING MAMMOGRAM FOR BREAST CANCER: ICD-10-CM

## 2024-03-11 DIAGNOSIS — Z23 NEED FOR PROPHYLACTIC VACCINATION AND INOCULATION AGAINST VARICELLA: ICD-10-CM

## 2024-03-11 DIAGNOSIS — F02.80 LATE ONSET ALZHEIMER'S DEMENTIA WITHOUT BEHAVIORAL DISTURBANCE (HCC): ICD-10-CM

## 2024-03-11 PROBLEM — F43.29 GRIEF REACTION WITH PROLONGED BEREAVEMENT: Status: RESOLVED | Noted: 2022-07-12 | Resolved: 2024-03-11

## 2024-03-11 PROCEDURE — G8484 FLU IMMUNIZE NO ADMIN: HCPCS | Performed by: INTERNAL MEDICINE

## 2024-03-11 PROCEDURE — 3017F COLORECTAL CA SCREEN DOC REV: CPT | Performed by: INTERNAL MEDICINE

## 2024-03-11 PROCEDURE — 1123F ACP DISCUSS/DSCN MKR DOCD: CPT | Performed by: INTERNAL MEDICINE

## 2024-03-11 PROCEDURE — G0439 PPPS, SUBSEQ VISIT: HCPCS | Performed by: INTERNAL MEDICINE

## 2024-03-11 ASSESSMENT — PATIENT HEALTH QUESTIONNAIRE - PHQ9
5. POOR APPETITE OR OVEREATING: 0
1. LITTLE INTEREST OR PLEASURE IN DOING THINGS: 1
10. IF YOU CHECKED OFF ANY PROBLEMS, HOW DIFFICULT HAVE THESE PROBLEMS MADE IT FOR YOU TO DO YOUR WORK, TAKE CARE OF THINGS AT HOME, OR GET ALONG WITH OTHER PEOPLE: 0
8. MOVING OR SPEAKING SO SLOWLY THAT OTHER PEOPLE COULD HAVE NOTICED. OR THE OPPOSITE, BEING SO FIGETY OR RESTLESS THAT YOU HAVE BEEN MOVING AROUND A LOT MORE THAN USUAL: 0
SUM OF ALL RESPONSES TO PHQ QUESTIONS 1-9: 6
3. TROUBLE FALLING OR STAYING ASLEEP: 1
2. FEELING DOWN, DEPRESSED OR HOPELESS: 2
9. THOUGHTS THAT YOU WOULD BE BETTER OFF DEAD, OR OF HURTING YOURSELF: 0
SUM OF ALL RESPONSES TO PHQ QUESTIONS 1-9: 6
SUM OF ALL RESPONSES TO PHQ9 QUESTIONS 1 & 2: 3
7. TROUBLE CONCENTRATING ON THINGS, SUCH AS READING THE NEWSPAPER OR WATCHING TELEVISION: 2
SUM OF ALL RESPONSES TO PHQ QUESTIONS 1-9: 6
4. FEELING TIRED OR HAVING LITTLE ENERGY: 0
SUM OF ALL RESPONSES TO PHQ QUESTIONS 1-9: 6
6. FEELING BAD ABOUT YOURSELF - OR THAT YOU ARE A FAILURE OR HAVE LET YOURSELF OR YOUR FAMILY DOWN: 0

## 2024-03-11 ASSESSMENT — LIFESTYLE VARIABLES
HOW OFTEN DO YOU HAVE A DRINK CONTAINING ALCOHOL: NEVER
HOW MANY STANDARD DRINKS CONTAINING ALCOHOL DO YOU HAVE ON A TYPICAL DAY: PATIENT DOES NOT DRINK

## 2024-03-11 NOTE — PROGRESS NOTES
Future  - TSH; Future    7. Hypothyroidism, unspecified type    - TSH; Future    8. Hyperglycemia    - Hemoglobin A1C; Future  - Comprehensive Metabolic Panel; Future    9. Screening for diabetes mellitus    - Hemoglobin A1C; Future  - Comprehensive Metabolic Panel; Future          Recommendations for Preventive Services Due: see orders and patient instructions/AVS.  Recommended screening schedule for the next 5-10 years is provided to the patient in written form: see Patient Instructions/AVS.    Return in about 6 months (around 9/11/2024) for check up for chronic medical problems.

## 2024-03-11 NOTE — PATIENT INSTRUCTIONS
breast cancer. The risk for breast cancer goes up as you get older.  If your doctor says that you have a high risk, ask about ways to reduce your risk. These could include taking medicine or having surgery. If you have a strong family history of breast cancer, ask your doctor about genetic testing.  You may have extra screening for breast cancer if you have a high risk.  How can you lower your risk?  Some things that increase your risk of breast cancer, such as your age and your genes, cannot be controlled. But you can do some things to help reduce your risk of breast cancer.  Try to stay physically active. Being active often may help protect you from breast cancer.  If possible, breastfeed your children.  If you drink alcohol, limit how much you drink. Any amount of alcohol may increase your risk for some types of cancer.  Do not smoke. When you quit smoking, you lower your chances of getting many types of cancer. If you need help quitting, talk to your doctor about stop-smoking programs and medicines. These can increase your chances of quitting for good.  If you are at high risk for breast cancer, talk with your doctor about recommend medicines or surgeries to lower your risk of breast cancer.  Eating healthy foods like fruits and vegetables and being at a weight that's healthy for you may lower your risk of cancer and have other health benefits.  What are the risks of screening?  Screening helps find breast cancer early. This can help save lives. But screening also has some possible risks.  Screening may give false-positive results.    This means the test seems to find cancer even though no cancer is there. This may lead to more tests or a biopsy to make sure you don't have cancer. False-positive results can lead to distress and unneeded tests.  Screening may find types of breast cancer that would never cause symptoms or be life-threatening.   There are some cancers that never cause harm. But doctors can't always

## 2024-03-12 LAB
ALBUMIN SERPL-MCNC: 4.6 G/DL (ref 3.4–5)
ALBUMIN/GLOB SERPL: 1.5 {RATIO} (ref 1.1–2.2)
ALP SERPL-CCNC: 139 U/L (ref 40–129)
ALT SERPL-CCNC: 13 U/L (ref 10–40)
ANION GAP SERPL CALCULATED.3IONS-SCNC: 12 MMOL/L (ref 3–16)
AST SERPL-CCNC: 19 U/L (ref 15–37)
BASOPHILS # BLD: 0.1 K/UL (ref 0–0.2)
BASOPHILS NFR BLD: 2.3 %
BILIRUB SERPL-MCNC: 0.3 MG/DL (ref 0–1)
BUN SERPL-MCNC: 13 MG/DL (ref 7–20)
CALCIUM SERPL-MCNC: 10 MG/DL (ref 8.3–10.6)
CHLORIDE SERPL-SCNC: 102 MMOL/L (ref 99–110)
CHOLEST SERPL-MCNC: 246 MG/DL (ref 0–199)
CO2 SERPL-SCNC: 27 MMOL/L (ref 21–32)
CREAT SERPL-MCNC: 0.8 MG/DL (ref 0.6–1.2)
DEPRECATED RDW RBC AUTO: 14.5 % (ref 12.4–15.4)
EOSINOPHIL # BLD: 0.1 K/UL (ref 0–0.6)
EOSINOPHIL NFR BLD: 2.1 %
EST. AVERAGE GLUCOSE BLD GHB EST-MCNC: 91.1 MG/DL
GFR SERPLBLD CREATININE-BSD FMLA CKD-EPI: >60 ML/MIN/{1.73_M2}
GLUCOSE SERPL-MCNC: 103 MG/DL (ref 70–99)
HBA1C MFR BLD: 4.8 %
HCT VFR BLD AUTO: 42.3 % (ref 36–48)
HDLC SERPL-MCNC: 75 MG/DL (ref 40–60)
HGB BLD-MCNC: 14.6 G/DL (ref 12–16)
LDLC SERPL CALC-MCNC: 142 MG/DL
LYMPHOCYTES # BLD: 1.5 K/UL (ref 1–5.1)
LYMPHOCYTES NFR BLD: 26 %
MCH RBC QN AUTO: 32.1 PG (ref 26–34)
MCHC RBC AUTO-ENTMCNC: 34.5 G/DL (ref 31–36)
MCV RBC AUTO: 93 FL (ref 80–100)
MONOCYTES # BLD: 0.4 K/UL (ref 0–1.3)
MONOCYTES NFR BLD: 6.6 %
NEUTROPHILS # BLD: 3.7 K/UL (ref 1.7–7.7)
NEUTROPHILS NFR BLD: 63 %
PLATELET # BLD AUTO: 313 K/UL (ref 135–450)
PMV BLD AUTO: 8.7 FL (ref 5–10.5)
POTASSIUM SERPL-SCNC: 4.2 MMOL/L (ref 3.5–5.1)
PROT SERPL-MCNC: 7.7 G/DL (ref 6.4–8.2)
RBC # BLD AUTO: 4.55 M/UL (ref 4–5.2)
SODIUM SERPL-SCNC: 141 MMOL/L (ref 136–145)
TRIGL SERPL-MCNC: 143 MG/DL (ref 0–150)
TSH SERPL DL<=0.005 MIU/L-ACNC: 2.87 UIU/ML (ref 0.27–4.2)
VLDLC SERPL CALC-MCNC: 29 MG/DL
WBC # BLD AUTO: 5.8 K/UL (ref 4–11)

## 2024-05-07 ENCOUNTER — TELEPHONE (OUTPATIENT)
Dept: INTERNAL MEDICINE CLINIC | Age: 74
End: 2024-05-07

## 2024-05-07 NOTE — TELEPHONE ENCOUNTER
Pt son called stating he was told his mom needed a evaluation for driving. He was seen by another doctor but was told insurance will cover it if Dr. Castaneda sign off for it. The pt will would be in charge for the coverage if that particular doctor were to schedule the evaluation. The pt son wanted to get a call back from Dr. Castaneda.      Gerard's callback # [phone]       Please advise...

## 2024-05-08 DIAGNOSIS — G30.1 LATE ONSET ALZHEIMER'S DEMENTIA WITHOUT BEHAVIORAL DISTURBANCE (HCC): Primary | ICD-10-CM

## 2024-05-08 DIAGNOSIS — F02.80 LATE ONSET ALZHEIMER'S DEMENTIA WITHOUT BEHAVIORAL DISTURBANCE (HCC): Primary | ICD-10-CM

## 2024-05-09 NOTE — TELEPHONE ENCOUNTER
Referral placed to [facility], Occupational Therapy for driving assessment.    Referred To: [facility]  [address] [facility] [address]         Loc/POS:                Phone: [phone]   Phone:     Fax:     Fax:

## 2024-06-20 ENCOUNTER — HOSPITAL ENCOUNTER (OUTPATIENT)
Dept: OCCUPATIONAL THERAPY | Age: 74
Setting detail: THERAPIES SERIES
Discharge: HOME | End: 2024-06-20
Payer: MEDICARE

## 2024-06-20 PROCEDURE — 97537 COMMUNITY/WORK REINTEGRATION: CPT

## 2024-06-20 PROCEDURE — 97165 OT EVAL LOW COMPLEX 30 MIN: CPT

## 2024-06-20 NOTE — PROGRESS NOTES
Outpatient Occupational Therapy  [] [facility]   Phone: [phone]   Fax: [phone]   [x] [facility]  Phone: [phone]   Fax: [phone]  [] [facility]   Phone: [phone]   Fax: [phone]      To:  Dr. Castaneda      Patient: Teresa Choi  : 1950  MRN: [id number]  Evaluation Date: 2024      Diagnosis Information: Alzheimer's Dementia            Occupational Therapy Certification/Re-Certification Form  Dear Dr. Catsaneda,  The following patient has been evaluated for occupational therapy services and for therapy to continue, Medicare requires monthly physician review of the treatment plan. Please review the attached evaluation and/or summary of the patient's plan of care, and verify that you agree therapy should continue by signing the attached document and sending it back to our office.    Plan of Care/Treatment to date:  [] Therapeutic Exercise   [] Modalities:  [] Therapeutic Activity    [] Ultrasound  [] Electrical Stimulation   [] Activities of Daily Living    [] Fluidotherapy [] Kinesiotaping  [] Neuromuscular Re-education   [] Iontophoresis [] Coldpack/hotpack   [] Instruction in HEP     [] Contrast Bath  [] Manual Therapy     Other:  [] Aquatic Therapy      [x] Maintain driving      Frequency/Duration:  # Days per week: [x] 1 day # Weeks: [x] 1 week [] 5 weeks      [] 2 days   [] 2 weeks [] 6 weeks     [] 3 days   [] 3 weeks [] 7 weeks     [] 4 days   [] 4 weeks [] 8 weeks    Rehab Potential: [] excellent [x] good [] fair  [] poor       Electronically signed by:  Scotty Sparks, CDRS, LDI      If you have any questions or concerns, please don't hesitate to call.  Thank you for your referral.      Physician Signature:________________________________Date:__________________  By signing above, therapist’s plan is approved by physician

## 2024-06-20 NOTE — PROGRESS NOTES
6/20/2024    Dear Dr. Castaneda      Teresa Choi (MR# [id number]) was seen by Occupational Therapy on 6/20/2024 for a ’s Evaluation at [facility].  As you know, Ms. Choi suffers from Alzheimer's Dementia. She wants to maintain driving to be independent in community mobility and leisure skills.    Ms. Choi passed tests for visual acuity, saccades, pursuits, depth perception, peripheral vision, color vision, and traffic signs and signals.  She was administered the Motor Free Visual Perceptual Test and scored below normal limits.  She was administered the Trails Making Tests Part A and B which are cognitive tests that involve scanning, speed of mental processing, visual motor sequencing, as well as switching cognitive sets.   On Part A, she scored within normal limits with 24.59 seconds over a norm of 60 seconds and on Part B, she scored within normal limits with 104.66 seconds over the norm of 180 seconds.  She was administered the [place] Maze Test which is a test of attention, visuoconstructional ability, and executive functions of planning and foresight.  She scored within normal limits. She demonstrated functional physical capacity for driving.    Behind the wheel, Ms. Choi was able to manipulate all vehicle controls.  She drove on secondary and primary roads in light to moderately heavy traffic.  She demonstrated the ability to park, back up, maintain speed and traffic flow, change lanes and follow directions.  Responses to situations encountered were appropriate.      Based on the results of this evaluation, it appears that Ms. Choi is a suitable candidate to maintain driving.  She is as safe as the driving public.   I spent time educating her on the importance of GPS tracking her vehicle in addition to being tracked through her phone by family members.  I also issued the resource, \"[place]: Family Conversations about Alzheimer's Disease, Dementia
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
## 2024-06-26 ENCOUNTER — TELEPHONE (OUTPATIENT)
Dept: INTERNAL MEDICINE CLINIC | Age: 74
End: 2024-06-26

## 2024-06-26 NOTE — TELEPHONE ENCOUNTER
Patient son Gerard Choi Called in for Dr. Castaneda in regards wanting to talk to Dr. Castaneda about the pt being referred out for driving evaluation and would like to discuss the observation with him., please advise.     Gerard's Callback# [phone]

## 2024-06-27 NOTE — TELEPHONE ENCOUNTER
Spoke with son.  He strongly feels she should not be driving due to cognitive decline.  Apparently, she had a good day today at her driving assessment.

## 2024-08-07 ENCOUNTER — TELEPHONE (OUTPATIENT)
Dept: INTERNAL MEDICINE CLINIC | Age: 74
End: 2024-08-07

## 2024-08-07 NOTE — TELEPHONE ENCOUNTER
Patient's son Gerard is calling in for  in regards to wanting a call back to follow up to last call he had with provider. Per Gerard it is in regards to her driving privileges and also has results to give.     Please call Gerard back at P# [phone]

## 2024-08-08 NOTE — TELEPHONE ENCOUNTER
Discussed with son.  He and other family members continue to have legitimate concerns regarding her driving.  Will discuss at her visit.

## 2024-09-11 ENCOUNTER — OFFICE VISIT (OUTPATIENT)
Dept: INTERNAL MEDICINE CLINIC | Age: 74
End: 2024-09-11

## 2024-09-11 VITALS
TEMPERATURE: 97.4 F | WEIGHT: 166 LBS | HEART RATE: 88 BPM | DIASTOLIC BLOOD PRESSURE: 60 MMHG | BODY MASS INDEX: 25.06 KG/M2 | SYSTOLIC BLOOD PRESSURE: 138 MMHG | OXYGEN SATURATION: 96 %

## 2024-09-11 DIAGNOSIS — F02.80 LATE ONSET ALZHEIMER'S DEMENTIA WITHOUT BEHAVIORAL DISTURBANCE (HCC): Primary | ICD-10-CM

## 2024-09-11 DIAGNOSIS — R73.9 HYPERGLYCEMIA: ICD-10-CM

## 2024-09-11 DIAGNOSIS — G30.1 LATE ONSET ALZHEIMER'S DEMENTIA WITHOUT BEHAVIORAL DISTURBANCE (HCC): Primary | ICD-10-CM

## 2024-09-11 DIAGNOSIS — E03.9 HYPOTHYROIDISM, UNSPECIFIED TYPE: ICD-10-CM

## 2024-09-11 DIAGNOSIS — F03.90 MAJOR NEUROCOGNITIVE DISORDER (HCC): ICD-10-CM

## 2024-09-11 DIAGNOSIS — E78.5 HYPERLIPIDEMIA, UNSPECIFIED HYPERLIPIDEMIA TYPE: ICD-10-CM

## 2024-09-11 DIAGNOSIS — Z23 FLU VACCINE NEED: ICD-10-CM

## 2024-09-11 RX ORDER — MEMANTINE HYDROCHLORIDE 5 MG/1
TABLET ORAL
Qty: 60 TABLET | Refills: 0 | Status: SHIPPED | OUTPATIENT
Start: 2024-09-11

## 2024-09-11 SDOH — ECONOMIC STABILITY: FOOD INSECURITY: WITHIN THE PAST 12 MONTHS, THE FOOD YOU BOUGHT JUST DIDN'T LAST AND YOU DIDN'T HAVE MONEY TO GET MORE.: NEVER TRUE

## 2024-09-11 SDOH — ECONOMIC STABILITY: INCOME INSECURITY: HOW HARD IS IT FOR YOU TO PAY FOR THE VERY BASICS LIKE FOOD, HOUSING, MEDICAL CARE, AND HEATING?: NOT HARD AT ALL

## 2024-09-11 SDOH — ECONOMIC STABILITY: FOOD INSECURITY: WITHIN THE PAST 12 MONTHS, YOU WORRIED THAT YOUR FOOD WOULD RUN OUT BEFORE YOU GOT MONEY TO BUY MORE.: NEVER TRUE

## 2024-09-12 LAB
ALBUMIN SERPL-MCNC: 4.5 G/DL (ref 3.4–5)
ALBUMIN/GLOB SERPL: 1.6 {RATIO} (ref 1.1–2.2)
ALP SERPL-CCNC: 135 U/L (ref 40–129)
ALT SERPL-CCNC: 14 U/L (ref 10–40)
ANION GAP SERPL CALCULATED.3IONS-SCNC: 13 MMOL/L (ref 3–16)
AST SERPL-CCNC: 20 U/L (ref 15–37)
BASOPHILS # BLD: 0.1 K/UL (ref 0–0.2)
BASOPHILS NFR BLD: 2.3 %
BILIRUB SERPL-MCNC: 0.3 MG/DL (ref 0–1)
BUN SERPL-MCNC: 14 MG/DL (ref 7–20)
CALCIUM SERPL-MCNC: 10.2 MG/DL (ref 8.3–10.6)
CHLORIDE SERPL-SCNC: 104 MMOL/L (ref 99–110)
CHOLEST SERPL-MCNC: 258 MG/DL (ref 0–199)
CO2 SERPL-SCNC: 25 MMOL/L (ref 21–32)
CREAT SERPL-MCNC: 0.9 MG/DL (ref 0.6–1.2)
DEPRECATED RDW RBC AUTO: 14.3 % (ref 12.4–15.4)
EOSINOPHIL # BLD: 0.1 K/UL (ref 0–0.6)
EOSINOPHIL NFR BLD: 1.4 %
EST. AVERAGE GLUCOSE BLD GHB EST-MCNC: 93.9 MG/DL
GFR SERPLBLD CREATININE-BSD FMLA CKD-EPI: 67 ML/MIN/{1.73_M2}
GLUCOSE SERPL-MCNC: 87 MG/DL (ref 70–99)
HBA1C MFR BLD: 4.9 %
HCT VFR BLD AUTO: 44.3 % (ref 36–48)
HDLC SERPL-MCNC: 81 MG/DL (ref 40–60)
HGB BLD-MCNC: 14.9 G/DL (ref 12–16)
LDLC SERPL CALC-MCNC: 145 MG/DL
LYMPHOCYTES # BLD: 1.8 K/UL (ref 1–5.1)
LYMPHOCYTES NFR BLD: 28.6 %
MCH RBC QN AUTO: 31.8 PG (ref 26–34)
MCHC RBC AUTO-ENTMCNC: 33.6 G/DL (ref 31–36)
MCV RBC AUTO: 94.7 FL (ref 80–100)
MONOCYTES # BLD: 0.4 K/UL (ref 0–1.3)
MONOCYTES NFR BLD: 6.8 %
NEUTROPHILS # BLD: 3.8 K/UL (ref 1.7–7.7)
NEUTROPHILS NFR BLD: 60.9 %
PLATELET # BLD AUTO: 246 K/UL (ref 135–450)
PLATELET BLD QL SMEAR: ADEQUATE
PMV BLD AUTO: 8.6 FL (ref 5–10.5)
POTASSIUM SERPL-SCNC: 4.3 MMOL/L (ref 3.5–5.1)
PROT SERPL-MCNC: 7.3 G/DL (ref 6.4–8.2)
RBC # BLD AUTO: 4.67 M/UL (ref 4–5.2)
RBC MORPH BLD: NORMAL
SLIDE REVIEW: NORMAL
SODIUM SERPL-SCNC: 142 MMOL/L (ref 136–145)
TRIGL SERPL-MCNC: 162 MG/DL (ref 0–150)
TSH SERPL DL<=0.005 MIU/L-ACNC: 2.77 UIU/ML (ref 0.27–4.2)
VLDLC SERPL CALC-MCNC: 32 MG/DL
WBC # BLD AUTO: 6.2 K/UL (ref 4–11)

## 2024-09-13 ENCOUNTER — TELEPHONE (OUTPATIENT)
Dept: INTERNAL MEDICINE CLINIC | Age: 74
End: 2024-09-13

## 2024-10-01 ENCOUNTER — TELEPHONE (OUTPATIENT)
Dept: INTERNAL MEDICINE CLINIC | Age: 74
End: 2024-10-01

## 2024-10-01 NOTE — TELEPHONE ENCOUNTER
wanted to me to call the patient to let her know that her colonoscopy is overdue as it seems she was due on 08/09/2024.   He also wanted me to give patient 's number with the phone number of [phone].       I called patient and she gave verbal understanding and wrote it down. I am scanning form into this encounter

## 2024-10-10 DIAGNOSIS — F03.90 MAJOR NEUROCOGNITIVE DISORDER (HCC): ICD-10-CM

## 2024-10-10 DIAGNOSIS — G30.1 LATE ONSET ALZHEIMER'S DEMENTIA WITHOUT BEHAVIORAL DISTURBANCE (HCC): ICD-10-CM

## 2024-10-10 DIAGNOSIS — F02.80 LATE ONSET ALZHEIMER'S DEMENTIA WITHOUT BEHAVIORAL DISTURBANCE (HCC): ICD-10-CM

## 2024-10-10 NOTE — TELEPHONE ENCOUNTER
Last appointment: 9/11/2024  Next appointment: 12/16/2024  Last refill: 9/11/24  Requested Prescriptions     Pending Prescriptions Disp Refills    memantine (NAMENDA) 5 MG tablet [Pharmacy Med Name: MEMANTINE HCL 5 MG TABLET] 60 tablet 0     Sig: TAKE 1 TABLET BY MOUTH NIGHTLY FOR 7 DAYS, THEN TAKE 1 TABLET TWICE DAILY FOR 7 DAYS, THEN TAKE 1 TABLET IN THE MORNING AND TAKE 2 TABLETS IN THE EVENING FOR 7 DAYS, THEN 2 TABLETS TWICE DAILY

## 2024-10-11 RX ORDER — DONEPEZIL HYDROCHLORIDE 10 MG/1
10 TABLET, FILM COATED ORAL 2 TIMES DAILY
Qty: 180 TABLET | Refills: 3 | Status: CANCELLED | OUTPATIENT
Start: 2024-10-11

## 2024-10-11 RX ORDER — MEMANTINE HYDROCHLORIDE 5 MG/1
TABLET ORAL
Qty: 60 TABLET | Refills: 0 | OUTPATIENT
Start: 2024-10-11

## 2024-10-14 DIAGNOSIS — F03.90 MAJOR NEUROCOGNITIVE DISORDER (HCC): ICD-10-CM

## 2024-10-14 DIAGNOSIS — F02.80 LATE ONSET ALZHEIMER'S DEMENTIA WITHOUT BEHAVIORAL DISTURBANCE (HCC): ICD-10-CM

## 2024-10-14 DIAGNOSIS — G30.1 LATE ONSET ALZHEIMER'S DEMENTIA WITHOUT BEHAVIORAL DISTURBANCE (HCC): ICD-10-CM

## 2024-10-14 RX ORDER — MEMANTINE HYDROCHLORIDE 5 MG/1
TABLET ORAL
Qty: 60 TABLET | Refills: 0 | OUTPATIENT
Start: 2024-10-14

## 2024-10-14 RX ORDER — MEMANTINE HYDROCHLORIDE 5 MG/1
TABLET ORAL
Qty: 60 TABLET | Refills: 0 | Status: CANCELLED | OUTPATIENT
Start: 2024-10-14

## 2024-10-14 RX ORDER — MEMANTINE HYDROCHLORIDE 10 MG/1
10 TABLET ORAL 2 TIMES DAILY
Qty: 180 TABLET | Refills: 3 | Status: SHIPPED | OUTPATIENT
Start: 2024-10-14

## 2024-10-14 NOTE — TELEPHONE ENCOUNTER
Pt called back in to check on the status of the Rx refill request for the     memantine (NAMENDA) 5 MG tablet   Did let the pt know that the Rx is being updated for the corrected dosage.

## 2024-10-14 NOTE — TELEPHONE ENCOUNTER
We absolutely need to confirm the current dose of Namenda (memantine) that she is taking.  The initial prescription was a titrating schedule which would land her at 10 mg twice daily.  The donepezil is 10 mg once daily.

## 2024-11-16 DIAGNOSIS — E05.00 GRAVES' DISEASE: ICD-10-CM

## 2024-11-18 RX ORDER — LEVOTHYROXINE SODIUM 25 UG/1
25 TABLET ORAL DAILY
Qty: 90 TABLET | Refills: 3 | Status: SHIPPED | OUTPATIENT
Start: 2024-11-18

## 2025-01-14 DIAGNOSIS — F02.80 LATE ONSET ALZHEIMER'S DEMENTIA WITHOUT BEHAVIORAL DISTURBANCE (HCC): ICD-10-CM

## 2025-01-14 DIAGNOSIS — G30.1 LATE ONSET ALZHEIMER'S DEMENTIA WITHOUT BEHAVIORAL DISTURBANCE (HCC): ICD-10-CM

## 2025-01-14 DIAGNOSIS — F03.90 MAJOR NEUROCOGNITIVE DISORDER (HCC): ICD-10-CM

## 2025-01-14 RX ORDER — DONEPEZIL HYDROCHLORIDE 10 MG/1
10 TABLET, FILM COATED ORAL NIGHTLY
Qty: 90 TABLET | Refills: 3 | Status: SHIPPED | OUTPATIENT
Start: 2025-01-14

## 2025-01-14 RX ORDER — MEMANTINE HYDROCHLORIDE 5 MG/1
TABLET ORAL
Qty: 60 TABLET | Refills: 3 | Status: SHIPPED | OUTPATIENT
Start: 2025-01-14

## 2025-01-14 NOTE — TELEPHONE ENCOUNTER
Last appointment: 9/11/2024  Return in about 3 months (around 12/11/2024) for check up for chronic medical problems.  Next appointment: Visit date not found  Last refill:   Aricept: 02/26/2024  Namenda: 09/11/2024  Sent [employer] message to schedule due/overdue appointment.

## 2025-02-21 ENCOUNTER — TELEPHONE (OUTPATIENT)
Dept: INTERNAL MEDICINE CLINIC | Age: 75
End: 2025-02-21

## 2025-02-21 NOTE — TELEPHONE ENCOUNTER
Spoke with , he advised urgent care. Patient needs seen asap and unfortunately he does not have any room today.     I called gerard's callback number and the patient answered the phone. I informed her of message above and she gave verbal understanding. She requested I call her son Gerard to let him know. I agreed and called and spoke with Gerard and he also gave verbal understanding and stated he will take her to urgent care.

## 2025-02-21 NOTE — TELEPHONE ENCOUNTER
Pt son Gerard called in for Dr. Castaneda in regards to his mom not feeling well. When he spoke to her on the phone the patient was c/o having a fever, horrible headaches, and dizziness. He is currently not with his mom but can go to the [facility] and a do a vv with Dr. Castaneda if possible. Not able to do a e-visit. Please advise.    Gerard callback# [phone]

## 2025-03-17 SDOH — ECONOMIC STABILITY: INCOME INSECURITY: IN THE LAST 12 MONTHS, WAS THERE A TIME WHEN YOU WERE NOT ABLE TO PAY THE MORTGAGE OR RENT ON TIME?: NO

## 2025-03-17 SDOH — ECONOMIC STABILITY: FOOD INSECURITY: WITHIN THE PAST 12 MONTHS, YOU WORRIED THAT YOUR FOOD WOULD RUN OUT BEFORE YOU GOT MONEY TO BUY MORE.: NEVER TRUE

## 2025-03-17 SDOH — ECONOMIC STABILITY: FOOD INSECURITY: WITHIN THE PAST 12 MONTHS, THE FOOD YOU BOUGHT JUST DIDN'T LAST AND YOU DIDN'T HAVE MONEY TO GET MORE.: NEVER TRUE

## 2025-03-17 ASSESSMENT — PATIENT HEALTH QUESTIONNAIRE - PHQ9
5. POOR APPETITE OR OVEREATING: SEVERAL DAYS
6. FEELING BAD ABOUT YOURSELF - OR THAT YOU ARE A FAILURE OR HAVE LET YOURSELF OR YOUR FAMILY DOWN: NOT AT ALL
8. MOVING OR SPEAKING SO SLOWLY THAT OTHER PEOPLE COULD HAVE NOTICED. OR THE OPPOSITE - BEING SO FIDGETY OR RESTLESS THAT YOU HAVE BEEN MOVING AROUND A LOT MORE THAN USUAL: NOT AT ALL
10. IF YOU CHECKED OFF ANY PROBLEMS, HOW DIFFICULT HAVE THESE PROBLEMS MADE IT FOR YOU TO DO YOUR WORK, TAKE CARE OF THINGS AT HOME, OR GET ALONG WITH OTHER PEOPLE: NOT DIFFICULT AT ALL
SUM OF ALL RESPONSES TO PHQ QUESTIONS 1-9: 5
8. MOVING OR SPEAKING SO SLOWLY THAT OTHER PEOPLE COULD HAVE NOTICED. OR THE OPPOSITE, BEING SO FIGETY OR RESTLESS THAT YOU HAVE BEEN MOVING AROUND A LOT MORE THAN USUAL: NOT AT ALL
1. LITTLE INTEREST OR PLEASURE IN DOING THINGS: NOT AT ALL
1. LITTLE INTEREST OR PLEASURE IN DOING THINGS: NOT AT ALL
7. TROUBLE CONCENTRATING ON THINGS, SUCH AS READING THE NEWSPAPER OR WATCHING TELEVISION: SEVERAL DAYS
3. TROUBLE FALLING OR STAYING ASLEEP: SEVERAL DAYS
4. FEELING TIRED OR HAVING LITTLE ENERGY: SEVERAL DAYS
9. THOUGHTS THAT YOU WOULD BE BETTER OFF DEAD, OR OF HURTING YOURSELF: NOT AT ALL
5. POOR APPETITE OR OVEREATING: SEVERAL DAYS
9. THOUGHTS THAT YOU WOULD BE BETTER OFF DEAD, OR OF HURTING YOURSELF: NOT AT ALL
SUM OF ALL RESPONSES TO PHQ QUESTIONS 1-9: 5
6. FEELING BAD ABOUT YOURSELF - OR THAT YOU ARE A FAILURE OR HAVE LET YOURSELF OR YOUR FAMILY DOWN: NOT AT ALL
4. FEELING TIRED OR HAVING LITTLE ENERGY: SEVERAL DAYS
3. TROUBLE FALLING OR STAYING ASLEEP: SEVERAL DAYS
SUM OF ALL RESPONSES TO PHQ QUESTIONS 1-9: 5
SUM OF ALL RESPONSES TO PHQ QUESTIONS 1-9: 5
2. FEELING DOWN, DEPRESSED OR HOPELESS: SEVERAL DAYS
10. IF YOU CHECKED OFF ANY PROBLEMS, HOW DIFFICULT HAVE THESE PROBLEMS MADE IT FOR YOU TO DO YOUR WORK, TAKE CARE OF THINGS AT HOME, OR GET ALONG WITH OTHER PEOPLE: NOT DIFFICULT AT ALL
SUM OF ALL RESPONSES TO PHQ QUESTIONS 1-9: 5
7. TROUBLE CONCENTRATING ON THINGS, SUCH AS READING THE NEWSPAPER OR WATCHING TELEVISION: SEVERAL DAYS
2. FEELING DOWN, DEPRESSED OR HOPELESS: SEVERAL DAYS

## 2025-03-20 ENCOUNTER — RESULTS FOLLOW-UP (OUTPATIENT)
Dept: INTERNAL MEDICINE CLINIC | Age: 75
End: 2025-03-20

## 2025-03-20 ENCOUNTER — OFFICE VISIT (OUTPATIENT)
Dept: INTERNAL MEDICINE CLINIC | Age: 75
End: 2025-03-20

## 2025-03-20 VITALS
DIASTOLIC BLOOD PRESSURE: 66 MMHG | TEMPERATURE: 97 F | HEART RATE: 79 BPM | BODY MASS INDEX: 25.03 KG/M2 | HEIGHT: 69 IN | SYSTOLIC BLOOD PRESSURE: 142 MMHG | OXYGEN SATURATION: 96 % | WEIGHT: 169 LBS

## 2025-03-20 DIAGNOSIS — E78.5 HYPERLIPIDEMIA, UNSPECIFIED HYPERLIPIDEMIA TYPE: ICD-10-CM

## 2025-03-20 DIAGNOSIS — E89.0 POSTABLATIVE HYPOTHYROIDISM: ICD-10-CM

## 2025-03-20 DIAGNOSIS — E05.00 GRAVES' DISEASE: ICD-10-CM

## 2025-03-20 DIAGNOSIS — F02.80 LATE ONSET ALZHEIMER'S DEMENTIA WITHOUT BEHAVIORAL DISTURBANCE (HCC): Primary | ICD-10-CM

## 2025-03-20 DIAGNOSIS — J31.0 CHRONIC RHINITIS: ICD-10-CM

## 2025-03-20 DIAGNOSIS — Z23 NEED FOR PNEUMOCOCCAL VACCINATION: ICD-10-CM

## 2025-03-20 DIAGNOSIS — G30.1 LATE ONSET ALZHEIMER'S DEMENTIA WITHOUT BEHAVIORAL DISTURBANCE (HCC): Primary | ICD-10-CM

## 2025-03-20 PROBLEM — R41.89 COGNITIVE IMPAIRMENT: Status: RESOLVED | Noted: 2021-07-13 | Resolved: 2025-03-20

## 2025-03-20 PROBLEM — K57.32 DIVERTICULITIS LARGE INTESTINE W/O PERFORATION OR ABSCESS W/O BLEEDING: Status: RESOLVED | Noted: 2017-06-23 | Resolved: 2025-03-20

## 2025-03-20 LAB
ALBUMIN SERPL-MCNC: 4.3 G/DL (ref 3.4–5)
ALBUMIN/GLOB SERPL: 1.7 {RATIO} (ref 1.1–2.2)
ALP SERPL-CCNC: 132 U/L (ref 40–129)
ALT SERPL-CCNC: 24 U/L (ref 10–40)
ANION GAP SERPL CALCULATED.3IONS-SCNC: 12 MMOL/L (ref 3–16)
AST SERPL-CCNC: 28 U/L (ref 15–37)
BILIRUB SERPL-MCNC: 0.4 MG/DL (ref 0–1)
BUN SERPL-MCNC: 12 MG/DL (ref 7–20)
CALCIUM SERPL-MCNC: 9.8 MG/DL (ref 8.3–10.6)
CHLORIDE SERPL-SCNC: 105 MMOL/L (ref 99–110)
CHOLEST SERPL-MCNC: 239 MG/DL (ref 0–199)
CO2 SERPL-SCNC: 24 MMOL/L (ref 21–32)
CREAT SERPL-MCNC: 0.7 MG/DL (ref 0.6–1.2)
GFR SERPLBLD CREATININE-BSD FMLA CKD-EPI: 90 ML/MIN/{1.73_M2}
GLUCOSE SERPL-MCNC: 80 MG/DL (ref 70–99)
HDLC SERPL-MCNC: 75 MG/DL (ref 40–60)
LDLC SERPL CALC-MCNC: 133 MG/DL
POTASSIUM SERPL-SCNC: 4.3 MMOL/L (ref 3.5–5.1)
PROT SERPL-MCNC: 6.9 G/DL (ref 6.4–8.2)
SODIUM SERPL-SCNC: 141 MMOL/L (ref 136–145)
TRIGL SERPL-MCNC: 154 MG/DL (ref 0–150)
TSH SERPL DL<=0.005 MIU/L-ACNC: 2.84 UIU/ML (ref 0.27–4.2)
VLDLC SERPL CALC-MCNC: 31 MG/DL

## 2025-03-20 RX ORDER — FLUTICASONE PROPIONATE 50 MCG
2 SPRAY, SUSPENSION (ML) NASAL DAILY
Qty: 16 G | Refills: 5 | Status: SHIPPED | OUTPATIENT
Start: 2025-03-20

## 2025-03-20 RX ORDER — PNEUMOCOCCAL 20-VALENT CONJUGATE VACCINE 2.2; 2.2; 2.2; 2.2; 2.2; 2.2; 2.2; 2.2; 2.2; 2.2; 2.2; 2.2; 2.2; 2.2; 2.2; 2.2; 4.4; 2.2; 2.2; 2.2 UG/.5ML; UG/.5ML; UG/.5ML; UG/.5ML; UG/.5ML; UG/.5ML; UG/.5ML; UG/.5ML; UG/.5ML; UG/.5ML; UG/.5ML; UG/.5ML; UG/.5ML; UG/.5ML; UG/.5ML; UG/.5ML; UG/.5ML; UG/.5ML; UG/.5ML; UG/.5ML
0.5 INJECTION, SUSPENSION INTRAMUSCULAR ONCE
Qty: 0.5 ML | Refills: 0 | Status: SHIPPED | OUTPATIENT
Start: 2025-03-20 | End: 2025-03-20

## 2025-03-20 NOTE — PROGRESS NOTES
[facility]  Internal Medicine  Patient Encounter  Sam Castaneda D.O., [facility]        Chief Complaint   Patient presents with    Check-Up     6 mo       HPI: 74 y.o. female seen today requesting routine checkup regarding the status of current chronic medical problems as to below along with a medication review and reconciliation.  Overall, she has been OK.      She has multiple medical problems including but not limited to Graves' disease (thyrotoxicosis), hypercholesterolemia, diverticulosis, diverticulitis x3, osteopenia, carpal tunnel syndrome, cognitive impairment, late onset Alzheimer's dementia without behavioral disturbance    Previous Hx----> Patient had neuropsychological assessment in 2021.  She had reassessment in 5/23/2022, Dr. Hunter.   She continues to have periods of repetitiveness and short-term memory impairment.   We had recommended a driving assessment which took place on 6/20/2024.  According to the evaluation the patient was going to be driven to the evaluation by her son but she drove herself.  She arrived at [facility] successfully without incident.  There were no problems identified during the assessment and it was recommended that she maintain driving privileges.  I spoke with the son 2 days after the test.  He communicated strongly that he did not feel she should be driving due to cognitive decline.  He thought that she had a \"good day\" during the assessment.  Her son called again on 8/7/2024.  He expressed ongoing concern regarding her driving.  He also indicated other family members had legitimate concerns with her driving.  Patient had repeat neuropsychological testing on 4/26/2023.  The ongoing diagnosis was mild neurocognitive disorder, most likely Alzheimer's disease with late onset.  Findings did not warrant the diagnosis of major neurocognitive disorder.  She had reevaluation on 4/30/2024.  The assessment after that encounter did include the diagnosis of major

## 2025-06-03 ENCOUNTER — OFFICE VISIT (OUTPATIENT)
Dept: INTERNAL MEDICINE CLINIC | Age: 75
End: 2025-06-03

## 2025-06-03 VITALS
OXYGEN SATURATION: 96 % | HEIGHT: 68 IN | TEMPERATURE: 97.2 F | HEART RATE: 78 BPM | SYSTOLIC BLOOD PRESSURE: 130 MMHG | WEIGHT: 169 LBS | BODY MASS INDEX: 25.61 KG/M2 | DIASTOLIC BLOOD PRESSURE: 66 MMHG

## 2025-06-03 DIAGNOSIS — Z00.00 MEDICARE ANNUAL WELLNESS VISIT, SUBSEQUENT: Primary | ICD-10-CM

## 2025-06-03 DIAGNOSIS — F02.80 LATE ONSET ALZHEIMER'S DEMENTIA WITHOUT BEHAVIORAL DISTURBANCE (HCC): ICD-10-CM

## 2025-06-03 DIAGNOSIS — G30.1 LATE ONSET ALZHEIMER'S DEMENTIA WITHOUT BEHAVIORAL DISTURBANCE (HCC): ICD-10-CM

## 2025-06-03 DIAGNOSIS — Z29.11 NEED FOR RSV VACCINATION: ICD-10-CM

## 2025-06-03 DIAGNOSIS — F42.4 SKIN PICKING HABIT: ICD-10-CM

## 2025-06-03 DIAGNOSIS — E78.5 HYPERLIPIDEMIA, UNSPECIFIED HYPERLIPIDEMIA TYPE: ICD-10-CM

## 2025-06-03 RX ORDER — RESPIRATORY SYNCYTIAL VISUS VACCINE RECOMBINANT, ADJUVANTED 120MCG/0.5
0.5 KIT INTRAMUSCULAR ONCE
Qty: 0.5 ML | Refills: 0 | Status: SHIPPED | OUTPATIENT
Start: 2025-06-03 | End: 2025-06-03

## 2025-06-03 RX ORDER — MINERAL OIL/HYDROPHIL PETROLAT
OINTMENT (GRAM) TOPICAL
COMMUNITY
Start: 2025-06-03

## 2025-06-03 RX ORDER — ASPIRIN 81 MG/1
81 TABLET ORAL DAILY
COMMUNITY
End: 2025-06-03 | Stop reason: SINTOL

## 2025-06-03 SDOH — HEALTH STABILITY: PHYSICAL HEALTH: ON AVERAGE, HOW MANY DAYS PER WEEK DO YOU ENGAGE IN MODERATE TO STRENUOUS EXERCISE (LIKE A BRISK WALK)?: 6 DAYS

## 2025-06-03 SDOH — HEALTH STABILITY: PHYSICAL HEALTH: ON AVERAGE, HOW MANY MINUTES DO YOU ENGAGE IN EXERCISE AT THIS LEVEL?: 60 MIN

## 2025-06-03 ASSESSMENT — PATIENT HEALTH QUESTIONNAIRE - PHQ9
SUM OF ALL RESPONSES TO PHQ QUESTIONS 1-9: 0
SUM OF ALL RESPONSES TO PHQ QUESTIONS 1-9: 0
1. LITTLE INTEREST OR PLEASURE IN DOING THINGS: NOT AT ALL
2. FEELING DOWN, DEPRESSED OR HOPELESS: NOT AT ALL
SUM OF ALL RESPONSES TO PHQ QUESTIONS 1-9: 0
SUM OF ALL RESPONSES TO PHQ QUESTIONS 1-9: 0

## 2025-06-03 ASSESSMENT — LIFESTYLE VARIABLES
HOW OFTEN DO YOU HAVE A DRINK CONTAINING ALCOHOL: 2
HOW OFTEN DO YOU HAVE A DRINK CONTAINING ALCOHOL: MONTHLY OR LESS
HOW OFTEN DO YOU HAVE SIX OR MORE DRINKS ON ONE OCCASION: 1
HOW MANY STANDARD DRINKS CONTAINING ALCOHOL DO YOU HAVE ON A TYPICAL DAY: PATIENT DOES NOT DRINK
HOW MANY STANDARD DRINKS CONTAINING ALCOHOL DO YOU HAVE ON A TYPICAL DAY: 0

## 2025-06-03 NOTE — PROGRESS NOTES
[facility]  Internal Medicine  Patient Encounter  Sam Castaneda D.O., [facility]       Medicare Annual Wellness Visit  Teresa JOHNSON Steph  6/3/2025    Subjective Teresa Choi is here for Medicare AWV      Medical/Surgical Histories     Past Medical History:   Diagnosis Date    Adenomatous colon polyp 10/21/2024    Bilateral carpal tunnel syndrome     Diverticulitis 08/2019    Diverticulitis 12/27/2020    Diverticulitis 10/06/2016    Diverticulitis large intestine w/o perforation or abscess w/o bleeding 06/23/2017    Diverticulosis     Graves' disease     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 left side forehead and scalp, had eye exam    Hyperthyroidism     Late onset Alzheimer's dementia without behavioral disturbance (HCC) 07/12/2022    Migraine 09/24/2014    Mild neurocognitive disorder 07/12/2022    Rotator cuff tear, right 12/05/2014          Past Surgical History:   Procedure Laterality Date    APPENDECTOMY      CATARACT REMOVAL WITH IMPLANT Bilateral     COLONOSCOPY  10/21/2024    FOOT SURGERY Bilateral     HYSTERECTOMY (CERVIX STATUS UNKNOWN)  2009    ROTATOR CUFF REPAIR Right            Medications/Allergies     Current Outpatient Medications   Medication Sig    aspirin 81 MG EC tablet Take 1 tablet by mouth daily    fluticasone (FLONASE) 50 MCG/ACT nasal spray 2 sprays by Each Nostril route daily    donepezil (ARICEPT) 10 MG tablet TAKE ONE TABLET BY MOUTH ONCE NIGHTLY    levothyroxine (SYNTHROID) 25 MCG tablet TAKE 1 TABLET BY MOUTH DAILY    memantine (NAMENDA) 10 MG tablet Take 1 tablet by mouth 2 times daily    Ascorbic Acid (VITAMIN C PO) Take by mouth Daily    VITAMIN D PO Take by mouth Daily    Multiple Vitamin (MULTI-VITAMIN DAILY PO) Take by mouth Daily       Allergies   Allergen Reactions    Antihistamines, Loratadine-Type          Substance Use History     Social

## 2025-06-03 NOTE — PATIENT INSTRUCTIONS
To Do List:    #1 Follow up with Dr. Hunter at [facility]    #2 Get an old fashioned calendar to keep track of days.      #3 Continue word finds, crossword puzzles.  New [place] Times, USA Today have Brain game sections.       Learning About Emotional Support  When do you need emotional support?     You might find getting support from others helpful when you have a long-term health problem. Often people feel alone, confused, or scared when coping with an illness. But you aren't alone. Other people are going through the same thing you are and know how you feel.  Talking with others about your feelings can help you feel better.  Your family and friends can give you support. So can your doctor, a support group, or a Oriental orthodox. If you have a support network, you will not feel as alone. You will learn new ways to deal with your situation, and you may try harder to overcome it.  Where you can get support  Family and friends: They can help you cope by giving you comfort and encouragement.  Counseling: Professional counseling can help you cope with situations that interfere with your life and cause stress. Counseling can help you understand and deal with your illness.  Your doctor: Find a doctor you trust and feel comfortable with. Be open and honest about your fears and concerns. Your doctor can help you get the right medical treatments, including counseling.  Spiritual or [employer]ism groups: They can provide comfort and may be able to help you find counseling or other social support services.  Social groups: They can help you meet new people and get involved in activities you enjoy.  Community support groups: In a support group, you can talk to others who have dealt with the same problems or illness as you. You can encourage one another and learn ways to cope with tough emotions.  How can you find a support group?  Finding a support group that works for you may take time. There are many options. Some groups

## 2025-09-05 ENCOUNTER — TELEPHONE (OUTPATIENT)
Dept: INTERNAL MEDICINE CLINIC | Age: 75
End: 2025-09-05